# Patient Record
Sex: FEMALE | Race: WHITE | Employment: FULL TIME | URBAN - METROPOLITAN AREA
[De-identification: names, ages, dates, MRNs, and addresses within clinical notes are randomized per-mention and may not be internally consistent; named-entity substitution may affect disease eponyms.]

---

## 2017-01-02 PROBLEM — R10.9 ABDOMINAL PAIN IN FEMALE: Status: ACTIVE | Noted: 2017-01-02

## 2017-03-22 ENCOUNTER — TRANSFERRED RECORDS (OUTPATIENT)
Dept: HEALTH INFORMATION MANAGEMENT | Facility: CLINIC | Age: 42
End: 2017-03-22

## 2017-05-18 ENCOUNTER — TRANSFERRED RECORDS (OUTPATIENT)
Dept: HEALTH INFORMATION MANAGEMENT | Facility: CLINIC | Age: 42
End: 2017-05-18

## 2017-07-24 ENCOUNTER — TRANSFERRED RECORDS (OUTPATIENT)
Dept: HEALTH INFORMATION MANAGEMENT | Facility: CLINIC | Age: 42
End: 2017-07-24

## 2018-06-04 ENCOUNTER — OFFICE VISIT (OUTPATIENT)
Dept: NEUROSURGERY | Facility: CLINIC | Age: 43
End: 2018-06-04
Attending: NURSE PRACTITIONER
Payer: COMMERCIAL

## 2018-06-04 ENCOUNTER — TELEPHONE (OUTPATIENT)
Dept: NEUROSURGERY | Facility: CLINIC | Age: 43
End: 2018-06-04

## 2018-06-04 ENCOUNTER — RADIANT APPOINTMENT (OUTPATIENT)
Dept: GENERAL RADIOLOGY | Facility: CLINIC | Age: 43
End: 2018-06-04
Attending: NURSE PRACTITIONER
Payer: COMMERCIAL

## 2018-06-04 ENCOUNTER — HOSPITAL ENCOUNTER (OUTPATIENT)
Dept: MRI IMAGING | Facility: CLINIC | Age: 43
Discharge: HOME | End: 2018-06-04
Attending: NURSE PRACTITIONER | Admitting: NURSE PRACTITIONER
Payer: COMMERCIAL

## 2018-06-04 VITALS — HEART RATE: 75 BPM | OXYGEN SATURATION: 97 % | SYSTOLIC BLOOD PRESSURE: 100 MMHG | DIASTOLIC BLOOD PRESSURE: 72 MMHG

## 2018-06-04 DIAGNOSIS — Z98.1 STATUS POST LUMBAR SPINAL FUSION: ICD-10-CM

## 2018-06-04 DIAGNOSIS — Z98.1 STATUS POST LUMBAR SPINAL FUSION: Primary | ICD-10-CM

## 2018-06-04 DIAGNOSIS — M53.3 CHRONIC LEFT SI JOINT PAIN: Primary | ICD-10-CM

## 2018-06-04 DIAGNOSIS — G89.29 CHRONIC LEFT SI JOINT PAIN: Primary | ICD-10-CM

## 2018-06-04 PROCEDURE — 25000128 H RX IP 250 OP 636: Performed by: RADIOLOGY

## 2018-06-04 PROCEDURE — A9585 GADOBUTROL INJECTION: HCPCS | Performed by: RADIOLOGY

## 2018-06-04 PROCEDURE — 99243 OFF/OP CNSLTJ NEW/EST LOW 30: CPT | Performed by: NURSE PRACTITIONER

## 2018-06-04 PROCEDURE — 72100 X-RAY EXAM L-S SPINE 2/3 VWS: CPT

## 2018-06-04 PROCEDURE — G0463 HOSPITAL OUTPT CLINIC VISIT: HCPCS | Performed by: NURSE PRACTITIONER

## 2018-06-04 PROCEDURE — 72158 MRI LUMBAR SPINE W/O & W/DYE: CPT

## 2018-06-04 RX ORDER — GADOBUTROL 604.72 MG/ML
10 INJECTION INTRAVENOUS ONCE
Status: COMPLETED | OUTPATIENT
Start: 2018-06-04 | End: 2018-06-04

## 2018-06-04 RX ADMIN — GADOBUTROL 8 ML: 604.72 INJECTION INTRAVENOUS at 11:42

## 2018-06-04 NOTE — PATIENT INSTRUCTIONS
1. Please call [facility] to have lumbar MRI completed. Call [phone] to schedule your scan.  I will contact you with results.     2. Please schedule your physical therapy.

## 2018-06-04 NOTE — NURSING NOTE
"Santa Lundberg is a 43 year old female who presents for:  Chief Complaint   Patient presents with     Neurologic Problem     Low back pain for 4 years, status post lumbar fusion DOS 03/22/17 w/Mina Krause         Vitals:    Vitals:    06/04/18 1010   BP: 100/72   BP Location: Right arm   Patient Position: Sitting   Cuff Size: Adult Regular   Pulse: 75   SpO2: 97%       BMI:  Estimated body mass index is 28.3 kg/(m^2) as calculated from the following:    Height as of 12/31/16: 5' 7\" (1.702 m).    Weight as of 12/31/16: 180 lb 11.2 oz (82 kg).    Pain Score:  Data Unavailable      Do you feel safe in your environment?  Yes      Isabel Valenzuela          "

## 2018-06-04 NOTE — MR AVS SNAPSHOT
After Visit Summary   6/4/2018    Santa Lundberg    MRN: [id number]           Patient Information     Date Of Birth          1975        Visit Information        Provider Department      6/4/2018 10:00 AM Debra Wills APRN CNP [facility]        Today's Diagnoses     Status post lumbar spinal fusion    -  1      Care Instructions    1. Please call [facility] to have lumbar MRI completed. Call [phone] to schedule your scan.  I will contact you with results.     2. Please schedule your physical therapy.            Follow-ups after your visit        Additional Services     [facility]ERRE PT, HAND, AND CHIROPRACTIC REFERRAL       **This order will print in the [facility] Scheduling Office**    Physical Therapy, Hand Therapy and Chiropractic Care are available through:    *[facility]  *[facility]  *[facility]    Call one number to schedule at any of the above locations: [phone].    Your provider has referred you to: Physical Therapy at [facility] or [facility]    Indication/Reason for Referral: low back and left hip leg   Onset of Illness: chronic   Therapy Orders: Evaluate and Treat  Special Programs: None  Special Request: None    Aj Rocha      Additional Comments for the Therapist or Chiropractor:       Please be aware that coverage of these services is subject to the terms and limitations of your health insurance plan.  Call member services at your health plan with any benefit or coverage questions.      Please bring the following to your appointment:    *Your personal calendar for scheduling future appointments  *Comfortable clothing                  Future tests that were ordered for you today     Open Future Orders        Priority Expected Expires Ordered    MR Lumbar Spine w/o Contrast Routine  6/4/2019 6/4/2018            Who to contact     If you have questions or need follow up information about today's  "clinic visit or your schedule please contact [facility] directly at [phone].  Normal or non-critical lab and imaging results will be communicated to you by MyChart, letter or phone within 4 business days after the clinic has received the results. If you do not hear from us within 7 days, please contact the clinic through [employer] or phone. If you have a critical or abnormal lab result, we will notify you by phone as soon as possible.  Submit refill requests through [employer] or call your pharmacy and they will forward the refill request to us. Please allow 3 business days for your refill to be completed.          Additional Information About Your Visit        MyChart Information     [employer] lets you send messages to your doctor, view your test results, renew your prescriptions, schedule appointments and more. To sign up, go to [email or web address] . Click on \"Log in\" on the left side of the screen, which will take you to the Welcome page. Then click on \"Sign up Now\" on the right side of the page.     You will be asked to enter the access code listed below, as well as some personal information. Please follow the directions to create your username and password.     Your access code is: [email or web address]  Expires: 2018 10:35 AM     Your access code will  in 90 days. If you need help or a new code, please call your [place] clinic or [phone].        Care EveryWhere ID     This is your Care EveryWhere ID. This could be used by other organizations to access your [place] medical records  [id number]        Your Vitals Were     Pulse Pulse Oximetry                75 97%           Blood Pressure from Last 3 Encounters:   18 100/72   17 117/68   10/20/13 122/70    Weight from Last 3 Encounters:   16 180 lb 11.2 oz (82 kg)   10/20/13 180 lb (81.6 kg)   12 145 lb (65.8 kg)              We Performed the Following     PIERRE PT, HAND, AND CHIROPRACTIC REFERRAL        "   Today's Medication Changes          These changes are accurate as of 6/4/18 10:35 AM.  If you have any questions, ask your nurse or doctor.               Stop taking these medicines if you haven't already. Please contact your care team if you have questions.     AMBIEN 5 MG tablet   Generic drug:  zolpidem   Stopped by:  Debra Wills APRN CNP           baclofen 10 MG tablet   Commonly known as:  LIORESAL   Stopped by:  Debra Wills APRN CNP           norethindrone-ethinyl estradiol 1-20 MG-MCG per tablet   Commonly known as:  JUNEL FE 1/20   Stopped by:  Debra Wills APRN CNP           oxyCODONE IR 5 MG tablet   Commonly known as:  ROXICODONE   Stopped by:  Debra Wills APRN CNP                    Primary Care Provider Office Phone # Fax #    Umesh Olivia PA-C [phone]       [facility] [address] [place] [address]        Equal Access to Services     [facility]: Hadii aad ku hadasho Soomaali, waaxda luqadaha, qaybta kaalmada adeegyada, waxay idiin hayaan deveneg shiraaramekhi golden'graham . So [facility] [phone].    ATENCIÓN: Si habla español, tiene a ramsey disposición servicios gratuitos de asistencia lingüística. [facility] [phone].    We comply with applicable federal civil rights laws and [place] laws. We do not discriminate on the basis of race, color, national origin, age, disability, sex, sexual orientation, or gender identity.            Thank you!     Thank you for choosing [facility]  for your care. Our goal is always to provide you with excellent care. Hearing back from our patients is one way we can continue to improve our services. Please take a few minutes to complete the written survey that you may receive in the mail after your visit with us. Thank you!             Your Updated Medication List - Protect others around you: Learn how to safely use, store and throw away your medicines at www.disposemymeds.org.          This list is  accurate as of 6/4/18 10:35 AM.  Always use your most recent med list.                   Brand Name Dispense Instructions for use Diagnosis    acetaminophen 325 MG tablet    TYLENOL    100 tablet    Take 2 tablets (650 mg) by mouth every 4 hours as needed for mild pain        ADVIL PO      Take 200 mg by mouth 2 times daily        GABAPENTIN      Take 300 mg by mouth 3 times daily as needed        LORAZEPAM PO      Take 0.5-1 mg by mouth daily as needed for anxiety        SERTRALINE HCL PO      Take 150 mg by mouth daily        TIZANIDINE HCL PO      Take 2 mg by mouth 3 times daily

## 2018-06-04 NOTE — PROGRESS NOTES
Dr. Reynaldo Joaquin  [facility]  Neurosurgery Clinic Visit      CC: left hip and buttock pain     Primary care Provider: Umesh Olivia      Reason For Visit:   I was asked by Dr. Olivia to consult on the patient for left hip and buttock pain.      HPI: Santa Lundberg is a 43 year old female with left hip and buttock pain.  The pt is post L5-S1 ALIF with Dr. Yoav Enriquez at [facility] on 3-.  She reports that she continues to have pain. She was referred her because her surgeon is no longer with [facility].  She also did not like the care she had She reports that prior to surgery she had severe lumbar radicular pain on the left.  She notes now her pain is low back with radiation to the left buttock, SI joint and hip.  She denies any paraesthesias. She notes pain with lifting and ROM. She states that she feels weak overall due to not being very active or doing PT. She states that she was told by the surgeon not to do any for 1 year post op.  She works from home at this time. She also has not had any recent injection therapy.      Pain at its worst 10  Pain right now:  4    Past Medical History:   Diagnosis Date     Kidney stone     9mm, required surgical removal       Past Medical History reviewed with patient during visit.    History reviewed. No pertinent surgical history.  Past Surgical History reviewed with patient during visit.    Current Outpatient Prescriptions   Medication     acetaminophen (TYLENOL) 325 MG tablet     GABAPENTIN     Ibuprofen (ADVIL PO)     LORAZEPAM PO     SERTRALINE HCL PO     TIZANIDINE HCL PO     No current facility-administered medications for this visit.        Allergies   Allergen Reactions     Bactrim [Sulfamethoxazole W-Trimethoprim]      Keflex [Cephalexin] Unknown     Penicillins      Rash and vomiting     Vancomycin Itching     Hot flashes       Social History     Social History     Marital status:      Spouse name: N/A     Number of  children: N/A     Years of education: N/A     Social History Main Topics     Smoking status: Never Smoker     Smokeless tobacco: Never Used     Alcohol use Yes      Comment: occasional     Drug use: No     Sexual activity: Not Asked     Other Topics Concern     None     Social History Narrative       History reviewed. No pertinent family history.      Review Of Systems  Skin: negative  Eyes: negative  Ears/Nose/Throat: negative  Respiratory: No shortness of breath, dyspnea on exertion, cough, or hemoptysis  Cardiovascular: negative  Gastrointestinal: hx rectal bleeding, abdominal pain   Genitourinary: negative  Musculoskeletal: post lumbar fusion, left hip pain   Neurologic: left leg shooting pain   Psychiatric: negative  Hematologic/Lymphatic/Immunologic: negative  Endocrine: negative     ROS: 10 point ROS neg other than the symptoms noted above in the HPI.      Vital Signs: /72 (BP Location: Right arm, Patient Position: Sitting, Cuff Size: Adult Regular)  Pulse 75  SpO2 97%    Examination:  Constitutional:  Alert, well nourished, NAD.  Memory: recent and remote memory intact  HEENT: Normocephalic, atraumatic.   Pulm:  Without shortness of breath   CV:  No pitting edema of BLE.    Neurological:  Awake  Alert  Oriented x 3  Speech clear  Cranial nerves II - XII intact  PERRL  EOMI  Face symmetric  Tongue midline  Motor exam   Shoulder Abduction:  Right:  5/5   Left:  5/5  Biceps:                      Right:  5/5   Left:  5/5  Triceps:                     Right:  5/5   Left:  5/5  Wrist Extensors:       Right:  5/5   Left:  5/5  Wrist Flexors:           Right:  5/5   Left:  5/5  Intrinsics:                   Right:  5/5   Left:  5/5   Hip Flexor:                Right: 5/5  Left:  5/5  Hip Adductor:             Right:  5/5  Left:  5/5  Hip Abductor:             Right:  5/5  Left:  5/5  Gastroc Soleus:        Right:  5/5  Left:  5/5  Tib/Ant:                      Right:  5/5  Left:  5/5  EHL:                           Right:  5/5  Left:  5/5   Sensation normal to bilateral upper and lower extremities  Muscle tone to bilateral upper and lower extremities normal   Gait: Able to stand from a seated position. Normal non-antalgic, non-myelopathic gait.  Able to heel/toe walk without loss of balance    Lumbar examination reveals tenderness of the spine and paraspinous muscles.  Restricted and painful ROM in all planes.   Hip height is symmetrical. Left SI joint pain with palpation, negative sciatic notch or greater trochanteric tenderness to palpation bilaterally.  Straight leg raise is negative bilaterally.      Imaging:     Lumbar XRay: shows anterior L4-5 fusion intact. Will wait for radiology read.       Assessment/Plan:    Santa Lundberg is a 43 year old female with left hip and buttock pain.  The pt is post L5-S1 ALIF with Dr. Yoav Enriquez at [facility] on 3-.  She reports that she continues to have pain. She was referred her because her surgeon is no longer with [facility].  She also did not like the care she had She reports that prior to surgery she had severe lumbar radicular pain on the left.  She notes now her pain is low back with radiation to the left buttock, SI joint and hip.  She denies any paraesthesias. She notes pain with lifting and ROM. She states that she feels weak overall due to not being very active or doing PT. She states that she was told by the surgeon not to do any for 1 year post op.  She works from home at this time. She also has not had any recent injection therapy.  The pt is neurologically intact.  We discussed that her fusion looks stable but will wait for radiology read. However, due to ongoing pain we will obtain an updated MRI and have her start PT.  She may benefit from a left SI joint injection in the future.     Patient Instructions   1. Please call [facility] to have lumbar MRI completed. Call [phone] to schedule your scan.  I will contact you with  results.     2. Please schedule your physical therapy.             Debra Wills [facility]  [facility]  [address] [address]    Tel [phone]  Pager [phone]

## 2018-06-04 NOTE — LETTER
6/4/2018         RE: Santa Lundberg  [address]  [place] [address]        Dear Colleague,    Thank you for referring your patient, Santa Lundberg, to the [facility]. Please see a copy of my visit note below.    Dr. Reynaldo Joaquin  [facility]  Neurosurgery Clinic Visit      CC: left hip and buttock pain     Primary care Provider: Umesh Olivia      Reason For Visit:   I was asked by Dr. Olivia to consult on the patient for left hip and buttock pain.      HPI: Santa Lundberg is a 43 year old female with left hip and buttock pain.  The pt is post L5-S1 ALIF with Dr. Yoav Enriquez at [facility] on 3-.  She reports that she continues to have pain. She was referred her because her surgeon is no longer with [facility].  She also did not like the care she had She reports that prior to surgery she had severe lumbar radicular pain on the left.  She notes now her pain is low back with radiation to the left buttock, SI joint and hip.  She denies any paraesthesias. She notes pain with lifting and ROM. She states that she feels weak overall due to not being very active or doing PT. She states that she was told by the surgeon not to do any for 1 year post op.  She works from home at this time. She also has not had any recent injection therapy.      Pain at its worst 10  Pain right now:  4    Past Medical History:   Diagnosis Date     Kidney stone     9mm, required surgical removal       Past Medical History reviewed with patient during visit.    History reviewed. No pertinent surgical history.  Past Surgical History reviewed with patient during visit.    Current Outpatient Prescriptions   Medication     acetaminophen (TYLENOL) 325 MG tablet     GABAPENTIN     Ibuprofen (ADVIL PO)     LORAZEPAM PO     SERTRALINE HCL PO     TIZANIDINE HCL PO     No current facility-administered medications for this visit.        Allergies   Allergen Reactions     Bactrim  [Sulfamethoxazole W-Trimethoprim]      Keflex [Cephalexin] Unknown     Penicillins      Rash and vomiting     Vancomycin Itching     Hot flashes       Social History     Social History     Marital status:      Spouse name: N/A     Number of children: N/A     Years of education: N/A     Social History Main Topics     Smoking status: Never Smoker     Smokeless tobacco: Never Used     Alcohol use Yes      Comment: occasional     Drug use: No     Sexual activity: Not Asked     Other Topics Concern     None     Social History Narrative       History reviewed. No pertinent family history.      Review Of Systems  Skin: negative  Eyes: negative  Ears/Nose/Throat: negative  Respiratory: No shortness of breath, dyspnea on exertion, cough, or hemoptysis  Cardiovascular: negative  Gastrointestinal: hx rectal bleeding, abdominal pain   Genitourinary: negative  Musculoskeletal: post lumbar fusion, left hip pain   Neurologic: left leg shooting pain   Psychiatric: negative  Hematologic/Lymphatic/Immunologic: negative  Endocrine: negative     ROS: 10 point ROS neg other than the symptoms noted above in the HPI.      Vital Signs: /72 (BP Location: Right arm, Patient Position: Sitting, Cuff Size: Adult Regular)  Pulse 75  SpO2 97%    Examination:  Constitutional:  Alert, well nourished, NAD.  Memory: recent and remote memory intact  HEENT: Normocephalic, atraumatic.   Pulm:  Without shortness of breath   CV:  No pitting edema of BLE.    Neurological:  Awake  Alert  Oriented x 3  Speech clear  Cranial nerves II - XII intact  PERRL  EOMI  Face symmetric  Tongue midline  Motor exam   Shoulder Abduction:  Right:  5/5   Left:  5/5  Biceps:                      Right:  5/5   Left:  5/5  Triceps:                     Right:  5/5   Left:  5/5  Wrist Extensors:       Right:  5/5   Left:  5/5  Wrist Flexors:           Right:  5/5   Left:  5/5  Intrinsics:                   Right:  5/5   Left:  5/5   Hip Flexor:                 Right: 5/5  Left:  5/5  Hip Adductor:             Right:  5/5  Left:  5/5  Hip Abductor:             Right:  5/5  Left:  5/5  Gastroc Soleus:        Right:  5/5  Left:  5/5  Tib/Ant:                      Right:  5/5  Left:  5/5  EHL:                          Right:  5/5  Left:  5/5   Sensation normal to bilateral upper and lower extremities  Muscle tone to bilateral upper and lower extremities normal   Gait: Able to stand from a seated position. Normal non-antalgic, non-myelopathic gait.  Able to heel/toe walk without loss of balance    Lumbar examination reveals tenderness of the spine and paraspinous muscles.  Restricted and painful ROM in all planes.   Hip height is symmetrical. Left SI joint pain with palpation, negative sciatic notch or greater trochanteric tenderness to palpation bilaterally.  Straight leg raise is negative bilaterally.      Imaging:     Lumbar XRay: shows anterior L4-5 fusion intact. Will wait for radiology read.       Assessment/Plan:    Santa Lundberg is a 43 year old female with left hip and buttock pain.  The pt is post L5-S1 ALIF with Dr. Yoav Enriquez at [facility] on 3-.  She reports that she continues to have pain. She was referred her because her surgeon is no longer with [facility].  She also did not like the care she had She reports that prior to surgery she had severe lumbar radicular pain on the left.  She notes now her pain is low back with radiation to the left buttock, SI joint and hip.  She denies any paraesthesias. She notes pain with lifting and ROM. She states that she feels weak overall due to not being very active or doing PT. She states that she was told by the surgeon not to do any for 1 year post op.  She works from home at this time. She also has not had any recent injection therapy.  The pt is neurologically intact.  We discussed that her fusion looks stable but will wait for radiology read. However, due to ongoing pain we will obtain an updated MRI  and have her start PT.  She may benefit from a left SI joint injection in the future.     Patient Instructions   1. Please call [facility] to have lumbar MRI completed. Call [phone] to schedule your scan.  I will contact you with results.     2. Please schedule your physical therapy.             Debra Wills CNP  [facility]  [address] [address]    Tel [phone]  Pager [phone]      Again, thank you for allowing me to participate in the care of your patient.        Sincerely,        SUNDAR Soto CNP

## 2018-06-04 NOTE — TELEPHONE ENCOUNTER
Pt contacted with MRI results. Fusion intact and MRI otherwise normal. Recc. SI joint and chiro care. No surgery indicated at this time. She agreed to the POC>

## 2018-06-05 ENCOUNTER — TELEPHONE (OUTPATIENT)
Dept: PALLIATIVE MEDICINE | Facility: CLINIC | Age: 43
End: 2018-06-05

## 2018-06-05 NOTE — TELEPHONE ENCOUNTER
Pre-screening Questions for Radiology Injections:    Injection to be done at which interventional clinic site? [facility], instruct patient to arrive 30 minutes before the scheduled appointment time.     Procedure ordered by Debra Wills    Procedure ordered? Left SI Joint Injection    What insurance would patient like us to bill for this procedure? [employer]      Worker's comp or MVA (motor vehicle accident) -Any injection DO NOT SCHEDULE and route to Shari Workman.      [employer] - For SI joint injections, DO NOT SCHEDULE and route Nya Andres. [employer] NO PA REQUIRED EFFECTIVE 11/1/2017      [employer]- MBB's must be scheduled at LEAST two weeks apart      [employer] - Any injection besides hip/shoulder/knee joint DO NOT SCHEDULE and route to Nya Andres. She will obtain PA and call pt back to schedule procedure or notify pt of denial.       [employer]-Route to Nya for review    Any chance of pregnancy? NO   If YES, do NOT schedule and route to RN pool    Is an  needed? No     Patient has a drive home? (mandatory) YES: INFORMED    Is patient taking any blood thinners (plavix, coumadin, jantoven, warfarin, heparin, pradaxa or dabigatran )? No   If hold needed, do NOT schedule, route to RN pool     Is patient taking any aspirin products? No     If more than 325mg/day do NOT schedule; route to RN pool     For CERVICAL procedures, hold all aspirin products for 6 days.      Does the patient have a bleeding or clotting disorder? No     If YES, okay to schedule AND route to RN nurse pool    **For any patients with platelet count <100, must be forwarded to provider**    Is patient diabetic?  No  If YES, have them bring their glucometer.    Does patient have an active infection or treated for one within the past week? No     Is patient currently taking any antibiotics?  No     For patients on chronic, preventative, or prophylactic antibiotics, procedures  may be scheduled.     For patients on antibiotics for active or recent infection:    Eber Velez Burton, Snitzer-antibiotic course must have been completed for 4 days    Is patient currently taking any steroid medications? (i.e. Prednisone, Medrol)  No     For patients on steroid medications:    Eber Velez Burton, Snitzer-steroid course must have been completed for 4 days    Reviewed with patient:  If you are started on any steroids or antibiotics between now and your appointment, you must contact us because it may affect our ability to perform your procedure.  Yes    Is patient actively being treated for cancer or immunocompromised? No  If YES, do NOT schedule and route to RN pool     Are you able to get on and off an exam table with minimal or no assistance? Yes  If NO, do NOT schedule and route to RN pool    Are you able to roll over and lay on your stomach with minimal or no assistance? Yes  If NO, do NOT schedule and route to RN pool     Any allergies to contrast dye, iodine, shellfish, or numbing and steroid medications? No  If YES, route to RN pool AND add allergy information to appointment notes    Allergies: Bactrim [sulfamethoxazole w-trimethoprim]; Keflex [cephalexin]; Penicillins; and Vancomycin      Has the patient had a flu shot or any other vaccinations within 7 days before or after the procedure.  No     Does patient have an MRI/CT?  Not Applicable  (SI joint, hip injections, lumbar sympathetic blocks, and stellate ganglion blocks do not require an MRI)    Was the MRI done w/in the last 3 years?  NA    Was MRI done at [place]? No      If not, where was it done? N/A       If MRI was not done at [place], [facility] or [facility] do NOT schedule and route to nursing.  If pt has an imaging disc, the injection may be scheduled but pt has to bring disc to appt. If they show up w/out disc the injection cannot be done    Reminders (please tell patient if applicable):        Instructed pt to arrive 30 minutes early for IV start if this is for a cervical procedure, ALL sympathetic (stellate ganglion, hypogastric, or lumbar sympathetic block) and all sedation procedures (RFA, spinal cord stimulation trials).  Not Applicable   -IVs are not routinely placed for Dr. Porter cervical cases   -Dr. Burgess: IVs for cervical ESIs and cervical TBDs (not CMBBs/facet inj)      If NPO for sedation, informed patient that it is okay to take medications with sips of water (except if they are to hold blood thinners).  Not Applicable   *DO take blood pressure medication if it is prescribed*      If this is for a cervical DOROTHY, informed patient that aspirin needs to be held for 6 days.   Not Applicable      For all patients not having spinal cord stimulator (SCS) trials or radiofrequency ablations (RFAs), informed patient:    IV sedation is not provided for this procedure.  If you feel that an oral anti-anxiety medication is needed, you can discuss this further with your referring provider or primary care provider.  The Pain Clinic provider will discuss specifics of what the procedure includes at your appointment.  Most procedures last 10-20 minutes.  We use numbing medications to help with any discomfort during the procedure.  Not Applicable      Do not schedule procedures requiring IV placement in the first appointment of the day or first appointment after lunch. Do NOT schedule at 0745, 0815 or 1245. N/A      For patients 85 or older we recommend having an adult stay w/ them for the remainder of the day.   N/A    Does the patient have any questions?  NO  Tanisha Davis  [facility]

## 2018-06-12 NOTE — PROGRESS NOTES
[facility] - Procedure Note    Date of Service: 6/13/2018  Procedure performed: Left sacroiliac joint injection  Diagnosis: Sacroiliac joint pain  : Tracee Porter MD & Isadora Pizarro MD (pain fellow)   Anesthesia: none    Indications: Santa Lundberg is a 43 year old female who is seen at the request of Debra Wills CNP for a sacroiliac joint injection. The patient describes left sided low back and buttock pain. Exam shows full strength and sensation in both lower extremities, tenderness of the sacroiliac (SI) joint, and positive FABERE on the left. The patient reports minimal improvement with conservative treatment, including PT, medication and previous L5-S1 fusion with hardware.    Imaging:  MRI lumbar spine completed on 6/04/2018 showed:  FINDINGS: Sagittal images demonstrate normal posterior alignment.  There has been anterior fusion procedure at L5-S1. There is a small  rudimentary disc at S-S2. The conus medullaris and cauda equina nerve  roots appear normal. Remainder of disc spaces appear normal. Bone  marrow signal intensity is normal. Postcontrast images do not show any  abnormal areas of enhancement.     L1-L2: Normal.     L2-L3: Normal.     L3-L4: Normal.     L4-L5: Minimal facet hypertrophy. No stenosis.     L5-S1: This level has been fused. There is no stenosis. There is some  minimal facet hypertrophy.     Paraspinal soft tissues: Unremarkable as visualized.       IMPRESSION:  1. Prior L5-S1 fusion procedure.  2. Minimal facet hypertrophy at L4-L5 and L5-S1 without any stenoses    Allergies:      Allergies   Allergen Reactions     Bactrim [Sulfamethoxazole W-Trimethoprim]      Keflex [Cephalexin] Unknown     Penicillins      Rash and vomiting     Vancomycin Itching     Hot flashes        Vitals:  There were no vitals taken for this visit.    Options/alternatives, benefits and risks were discussed with the patient including bleeding, infection, tissue trauma, exposure  to radiation, reaction to medications including seizure, nerve injury, weakness, and numbness.  Questions were answered to her satisfaction and she agrees to proceed. Voluntary informed consent was obtained and signed.     Procedure:  After getting informed consent, patient was brought into the procedure suite and was placed in a prone position on the procedure table.   A Pause for the Cause was performed.  Patient was prepped and draped in sterile fashion.     After identifying the left SI joint, the C-arm was rotated to a obliquely to obtain the best view of the inferior angle of the joint.  A total of 2 ml of Lidocaine 1%  was used to anesthetize the skin at a skin entry site coaxial with the fluoroscopy beam at this location.  A 22 gauge 3.5 inch needle was advanced under intermittent fluoroscopy until it was felt to enter the SI joint.    A total of 1 ml of Omnipaque-300 was injected, confirming appropriate position, with spread into the intraarticular space, with no intravascular uptake noted.  9 ml of Omnipaque-300 was wasted. Location was verified in lateral view.    1 ml of 1% lidocaine, 1 ml of 0.5% bupivacaine with 40 mg of kenalog was injected.  The needle was removed. Hemostasis was achieved, the area was cleaned, and bandaids were placed when appropriate.  The patient tolerated the procedure well, and was taken to the recovery room.    Images were saved to PACS.    Pre-procedure pain score: 5/10  Post-procedure pain score: 3/10  Following today's procedure, the patient was advised to contact the [facility] for any of the following:   Fever, chills, or night sweats   New onset of pain, numbness, or weakness   Any questions/concerns regarding the procedure    -f/u with the referring provider      Tracee Porter MD   [facility]

## 2018-06-13 ENCOUNTER — RADIANT APPOINTMENT (OUTPATIENT)
Dept: GENERAL RADIOLOGY | Facility: CLINIC | Age: 43
End: 2018-06-13
Attending: ANESTHESIOLOGY
Payer: COMMERCIAL

## 2018-06-13 ENCOUNTER — RADIOLOGY INJECTION OFFICE VISIT (OUTPATIENT)
Dept: PALLIATIVE MEDICINE | Facility: CLINIC | Age: 43
End: 2018-06-13
Payer: COMMERCIAL

## 2018-06-13 VITALS — DIASTOLIC BLOOD PRESSURE: 103 MMHG | HEART RATE: 94 BPM | SYSTOLIC BLOOD PRESSURE: 153 MMHG | OXYGEN SATURATION: 96 %

## 2018-06-13 DIAGNOSIS — M53.3 CHRONIC LEFT SI JOINT PAIN: ICD-10-CM

## 2018-06-13 DIAGNOSIS — G89.29 CHRONIC LEFT SI JOINT PAIN: ICD-10-CM

## 2018-06-13 DIAGNOSIS — M46.1 SACROILIITIS (H): Primary | ICD-10-CM

## 2018-06-13 PROCEDURE — 27096 INJECT SACROILIAC JOINT: CPT | Mod: LT | Performed by: ANESTHESIOLOGY

## 2018-06-13 NOTE — NURSING NOTE
Discharge Information    IV Discontiued Time:  NA    Amount of Fluid Infused:  NA    Discharge Criteria = When patient returns to baseline or as per MD order    Consciousness:  Pt is fully awake    Circulation:  BP +/- 20% of pre-procedure level    Respiration:  Patient is able to breathe deeply    O2 Sat:  Patient is able to maintain O2 Sat >92% on room air    Activity:  Moves 4 extremities on command    Ambulation:  Patient is able to stand and walk or stand and pivot into wheelchair    Dressing:  Clean/dry or No Dressing    Notes:   Discharge instructions and AVS given to patient    Patient meets criteria for discharge?  YES    Admitted to PCU?  No    Responsible adult present to accompany patient home?  Yes    Signature/Title:    Karen Soto RN Care Coordinator  [facility]

## 2018-06-13 NOTE — MR AVS SNAPSHOT
After Visit Summary   6/13/2018    Santa Lundberg    MRN: [id number]           Patient Information     Date Of Birth          1975        Visit Information        Provider Department      6/13/2018 2:15 PM Tracee Porter MD [facility]        Care Instructions    [facility] Procedure Discharge Instructions    Today you saw:   Dr. Tracee Porter           Your procedure:     Sacro-iliac joint injection       Medications used:  Lidocaine (anesthetic)  Bupivacaine (anesthetic)   Kenalog (steroid)  Omnipaque (contrast)                Be cautious when walking as numbness and/or weakness in the legs may occur up to 6-8 hours after the procedure due to effect of the local anesthetic    Do not drive for 6 hours. The effect of the local anesthetic could slow your reflexes.     Avoid strenuous activity for the first 24 hours. You may resume your regular activities after that.     You may shower, however avoid swimming, tub baths or hot tubs for 24 hours following your procedure    You may have a mild to moderate increase in pain for several days following the injection.      You may use ice packs for 10-15 minutes, 3 to 4 times a day at the injection site for comfort    Do not use heat to painful areas for 6 to 8 hours. This will give the local anesthetic time to wear off and prevent you from accidentally burning your skin.    You may use anti-inflammatory medications (such as Ibuprofen/Advil or Aleve) or Tylenol for pain control if necessary    With diabetes, check your blood sugar more frequently than usual as your blood sugar may be higher than normal for 10-14 days following a steroid injection. Contact your doctor who manages your diabetes if your blood sugar is higher than usual    It may take up to 14 days for the steroid medication to start working although you may feel the effect as early as a few days after the procedure.     Follow up with your referring provider in  "2-3 weeks      If you experience any of the following, call the pain center line during work hours at [phone] or on-call physician after hours at [phone]:  -Fever over 100 degree F  -Swelling, bleeding, redness, drainage, warmth at the injection site  -Progressive weakness or numbness in your legs or arms  -Loss of bowel or bladder function  -Unusual headache that is not relieved by Tylenol or your regular headache medication  -Unusual new onset of pain that is not improving    Phone #s:  Nurse triage line for general questions: [phone]          Follow-ups after your visit        Who to contact     If you have questions or need follow up information about today's clinic visit or your schedule please contact [facility] directly at [phone].  Normal or non-critical lab and imaging results will be communicated to you by [email or web address], letter or phone within 4 business days after the clinic has received the results. If you do not hear from us within 7 days, please contact the clinic through [email or web address] or phone. If you have a critical or abnormal lab result, we will notify you by phone as soon as possible.  Submit refill requests through [employer] or call your pharmacy and they will forward the refill request to us. Please allow 3 business days for your refill to be completed.          Additional Information About Your Visit        [employer] Information     [employer] lets you send messages to your doctor, view your test results, renew your prescriptions, schedule appointments and more. To sign up, go to [email or web address] . Click on \"Log in\" on the left side of the screen, which will take you to the Welcome page. Then click on \"Sign up Now\" on the right side of the page.     You will be asked to enter the access code listed below, as well as some personal information. Please follow the directions to create your username and password.     Your access code is: [email or web address]  Expires: 9/2/2018 10:35 " AM     Your access code will  in 90 days. If you need help or a new code, please call your [facility] clinic or [phone].        Care EveryWhere ID     This is your Care EveryWhere ID. This could be used by other organizations to access your [facility] medical records  [id number]        Your Vitals Were     Pulse Pulse Oximetry                101 97%           Blood Pressure from Last 3 Encounters:   18 (!) 158/102   18 100/72   17 117/68    Weight from Last 3 Encounters:   16 82 kg (180 lb 11.2 oz)   10/20/13 81.6 kg (180 lb)   12 65.8 kg (145 lb)              Today, you had the following     No orders found for display       Primary Care Provider Office Phone # Fax #    Umesh Olivia PA-C [phone]       [facility] [address] [place] [address]        Equal Access to Services     [facility]: Hadii aad ku hadasho Soomaali, waaxda luqadaha, qaybta kaalmada adeegyada, waxay idiin hayaan [employer] . [facility] [phone].    ATENCIÓN: Si habla español, tiene a ramsey disposición servicios gratuitos de asistencia lingüística. Llame al [phone].    We comply with applicable federal civil rights laws and [place] laws. We do not discriminate on the basis of race, color, national origin, age, disability, sex, sexual orientation, or gender identity.            Thank you!     Thank you for choosing [facility]  for your care. Our goal is always to provide you with excellent care. Hearing back from our patients is one way we can continue to improve our services. Please take a few minutes to complete the written survey that you may receive in the mail after your visit with us. Thank you!             Your Updated Medication List - Protect others around you: Learn how to safely use, store and throw away your medicines at www.disposemymeds.org.          This list is accurate as of 18  2:42 PM.  Always use your most recent med list.                    Brand Name Dispense Instructions for use Diagnosis    acetaminophen 325 MG tablet    TYLENOL    100 tablet    Take 2 tablets (650 mg) by mouth every 4 hours as needed for mild pain        ADVIL PO      Take 200 mg by mouth 2 times daily        GABAPENTIN      Take 300 mg by mouth 3 times daily as needed        LORAZEPAM PO      Take 0.5-1 mg by mouth daily as needed for anxiety        SERTRALINE HCL PO      Take 150 mg by mouth daily        TIZANIDINE HCL PO      Take 2 mg by mouth 3 times daily

## 2018-06-13 NOTE — PATIENT INSTRUCTIONS
[facility] Procedure Discharge Instructions    Today you saw:   Dr. Tracee Porter           Your procedure:     Sacro-iliac joint injection       Medications used:  Lidocaine (anesthetic)  Bupivacaine (anesthetic)   Kenalog (steroid)  Omnipaque (contrast)                Be cautious when walking as numbness and/or weakness in the legs may occur up to 6-8 hours after the procedure due to effect of the local anesthetic    Do not drive for 6 hours. The effect of the local anesthetic could slow your reflexes.     Avoid strenuous activity for the first 24 hours. You may resume your regular activities after that.     You may shower, however avoid swimming, tub baths or hot tubs for 24 hours following your procedure    You may have a mild to moderate increase in pain for several days following the injection.      You may use ice packs for 10-15 minutes, 3 to 4 times a day at the injection site for comfort    Do not use heat to painful areas for 6 to 8 hours. This will give the local anesthetic time to wear off and prevent you from accidentally burning your skin.    You may use anti-inflammatory medications (such as Ibuprofen/Advil or Aleve) or Tylenol for pain control if necessary    With diabetes, check your blood sugar more frequently than usual as your blood sugar may be higher than normal for 10-14 days following a steroid injection. Contact your doctor who manages your diabetes if your blood sugar is higher than usual    It may take up to 14 days for the steroid medication to start working although you may feel the effect as early as a few days after the procedure.     Follow up with your referring provider in 2-3 weeks      If you experience any of the following, call the pain center line during work hours at [phone] or on-call physician after hours at [phone]:  -Fever over 100 degree F  -Swelling, bleeding, redness, drainage, warmth at the injection site  -Progressive weakness or numbness in your  legs or arms  -Loss of bowel or bladder function  -Unusual headache that is not relieved by Tylenol or your regular headache medication  -Unusual new onset of pain that is not improving    Phone #s:  Nurse triage line for general questions: [phone]

## 2018-06-18 ENCOUNTER — THERAPY VISIT (OUTPATIENT)
Dept: PHYSICAL THERAPY | Facility: CLINIC | Age: 43
End: 2018-06-18
Payer: COMMERCIAL

## 2018-06-18 DIAGNOSIS — M54.9 BACK PAIN: Primary | ICD-10-CM

## 2018-06-18 PROCEDURE — 97110 THERAPEUTIC EXERCISES: CPT | Mod: GP | Performed by: PHYSICAL THERAPIST

## 2018-06-18 PROCEDURE — 97161 PT EVAL LOW COMPLEX 20 MIN: CPT | Mod: GP | Performed by: PHYSICAL THERAPIST

## 2018-06-18 ASSESSMENT — ACTIVITIES OF DAILY LIVING (ADL)
HOS_ADL_SCORE(%): 52.94
STANDING_FOR_15_MINUTES: EXTREME DIFFICULTY
WALKING_INITIALLY: SLIGHT DIFFICULTY
LIGHT_TO_MODERATE_WORK: MODERATE DIFFICULTY
WALKING_DOWN_STEEP_HILLS: MODERATE DIFFICULTY
WALKING_15_MINUTES_OR_GREATER: EXTREME DIFFICULTY
DEEP_SQUATTING: MODERATE DIFFICULTY
HEAVY_WORK: EXTREME DIFFICULTY
GETTING_INTO_AND_OUT_OF_AN_AVERAGE_CAR: SLIGHT DIFFICULTY
RECREATIONAL_ACTIVITIES: MODERATE DIFFICULTY
STEPPING_UP_AND_DOWN_CURBS: NO DIFFICULTY AT ALL
GETTING_INTO_AND_OUT_OF_A_BATHTUB: MODERATE DIFFICULTY
GOING_UP_1_FLIGHT_OF_STAIRS: SLIGHT DIFFICULTY
HOS_ADL_ITEM_SCORE_TOTAL: 36
PUTTING_ON_SOCKS_AND_SHOES: SLIGHT DIFFICULTY
WALKING_UP_STEEP_HILLS: MODERATE DIFFICULTY
HOS_ADL_HIGHEST_POTENTIAL_SCORE: 68
HOS_ADL_COUNT: 17
HOW_WOULD_YOU_RATE_YOUR_CURRENT_LEVEL_OF_FUNCTION_DURING_YOUR_USUAL_ACTIVITIES_OF_DAILY_LIVING_FROM_0_TO_100_WITH_100_BEING_YOUR_LEVEL_OF_FUNCTION_PRIOR_TO_YOUR_HIP_PROBLEM_AND_0_BEING_THE_INABILITY_TO_PERFORM_ANY_OF_YOUR_USUAL_DAILY_ACTIVITIES?: 60
GOING_DOWN_1_FLIGHT_OF_STAIRS: SLIGHT DIFFICULTY
WALKING_APPROXIMATELY_10_MINUTES: MODERATE DIFFICULTY
ROLLING_OVER_IN_BED: MODERATE DIFFICULTY
TWISTING/PIVOTING_ON_INVOLVED_LEG: EXTREME DIFFICULTY
SITTING_FOR_15_MINUTES: MODERATE DIFFICULTY

## 2018-06-18 NOTE — PROGRESS NOTES
"[facility] Initial Evaluation  Subjective:  Patient is a 43 year old female presenting with rehab back hpi. The history is provided by the patient. No  was used.   Santa Lundberg is a 43 year old female with a lumbar and sacroiliac condition.  Condition occurred with:  Insidious onset.  Condition occurred: for unknown reasons.  This is a chronic condition  Patient reports persistent low back pain since 2012.  Patient had one level fusion in 3/2017.  Leg symptoms resolved with fusion.  Fusion helped LBP 20-30%.  Patient reports no improvement in LBP since left SI injection on 6/13/2018.  Patient c/o pain with prolong sitting, standing, and brisk walking.  Patient denies radicular symptoms.  Physical therapy prior to surgery involved core strengthening with machines.  Physical therapy was ordered 6/4/2018.    Patient reports pain:  SI joint left, lumbar spine left, SI joint right and lumbar spine right (L >> R).  Radiates to:  Gluteals left and gluteals right.  Pain is described as aching (with \"burst and flare\" of pain) and is constant and reported as 4/10.  Associated symptoms:  Loss of motion/stiffness. Pain is the same all the time.  Symptoms are exacerbated by standing and sitting (changing positions) and relieved by activity/movement, NSAID's, muscle relaxants and ice.  Since onset symptoms are gradually worsening.  Special tests:  X-ray and MRI.  Previous treatment: none recently.    General health as reported by patient is fair.  Pertinent medical history includes:  Overweight and migraines/headaches.  Medical allergies: no.  Other surgeries include:  Orthopedic surgery and other.  Current medications:  Sleep medication, muscle relaxants and anti-inflammatory.  Current occupation .    Primary job tasks include:  Prolonged sitting.    Barriers include:  None as reported by the patient.    Red flags:  None as reported by the patient.                    "     Objective:  Standing Alignment:        Lumbar:  Normal            Gait:    Gait Type:  Normal                    Lumbar/SI Evaluation  ROM:    AROM Lumbar:   Flexion:          Min Loss  Ext:                    Mod Loss.  Increased ROM after repeated press-up.   Side Bend:        Left:     Right:   Rotation:           Left:     Right:   Side Glide:        Left:  WNL - ERP    Right:  WNL        Strength: Fair core strength  Lumbar Myotomes:  normal                  Neural Tension/Mobility:      Left side:SLR  negative.     Right side:   SLR  negative.       Lumbar Provocation:    Left positive with:  Mobility and PROM hip  Right positive with: Mobility and PROM hip    SI joint/Sacrum:        Left positive at:    Squish; Ilium Ventromedial; Thigh thrust and Sacral thrust  Right positive at:    Squish; Ilium Ventromedial; Thigh thrust and Sacral thrust                                                 General     ROS    Assessment/Plan:    Patient is a 43 year old female with lumbar and sacral complaints.    Patient has the following significant findings with corresponding treatment plan.                Diagnosis 1:  Lumbar derangement  Pain -  self management, education, directional preference exercise and home program  Decreased ROM/flexibility - therapeutic exercise, therapeutic activity and home program  Decreased strength - therapeutic exercise, therapeutic activities and home program  Impaired muscle performance - neuro re-education and home program  Decreased function - therapeutic activities and home program    Therapy Evaluation Codes:   1) History comprised of:   Personal factors that impact the plan of care:      None.    Comorbidity factors that impact the plan of care are:      High blood pressure and Overweight.     Medications impacting care: Anti-inflammatory, Muscle relaxant and Sleep.  2) Examination of Body Systems comprised of:   Body structures and functions that impact the plan of care:       Lumbar spine and Sacral illiac joint.   Activity limitations that impact the plan of care are:      Sitting, Standing and Walking.  3) Clinical presentation characteristics are:   Stable/Uncomplicated.  4) Decision-Making    Low complexity using standardized patient assessment instrument and/or measureable assessment of functional outcome.  Cumulative Therapy Evaluation is: Low complexity.    Previous and current functional limitations:  (See Goal Flow Sheet for this information)    Short term and Long term goals: (See Goal Flow Sheet for this information)     Communication ability:  Patient appears to be able to clearly communicate and understand verbal and written communication and follow directions correctly.  Treatment Explanation - The following has been discussed with the patient:   RX ordered/plan of care  Anticipated outcomes  Possible risks and side effects  This patient would benefit from PT intervention to resume normal activities.   Rehab potential is good.    Frequency:  1 X week, once daily  Duration:  for 8 weeks  Discharge Plan:  Achieve all LTG.  Independent in home treatment program.  Reach maximal therapeutic benefit.    Please refer to the daily flowsheet for treatment today, total treatment time and time spent performing 1:1 timed codes.

## 2018-06-25 ENCOUNTER — THERAPY VISIT (OUTPATIENT)
Dept: PHYSICAL THERAPY | Facility: CLINIC | Age: 43
End: 2018-06-25
Payer: COMMERCIAL

## 2018-06-25 DIAGNOSIS — M54.9 BACK PAIN: ICD-10-CM

## 2018-06-25 PROCEDURE — 97110 THERAPEUTIC EXERCISES: CPT | Mod: GP | Performed by: PHYSICAL THERAPIST

## 2018-06-25 PROCEDURE — 97112 NEUROMUSCULAR REEDUCATION: CPT | Mod: GP | Performed by: PHYSICAL THERAPIST

## 2018-07-02 ENCOUNTER — THERAPY VISIT (OUTPATIENT)
Dept: PHYSICAL THERAPY | Facility: CLINIC | Age: 43
End: 2018-07-02
Payer: COMMERCIAL

## 2018-07-02 DIAGNOSIS — M54.9 BACK PAIN: ICD-10-CM

## 2018-07-02 PROCEDURE — 97110 THERAPEUTIC EXERCISES: CPT | Mod: GP | Performed by: PHYSICAL THERAPIST

## 2018-07-02 PROCEDURE — 97112 NEUROMUSCULAR REEDUCATION: CPT | Mod: GP | Performed by: PHYSICAL THERAPIST

## 2018-07-16 ENCOUNTER — THERAPY VISIT (OUTPATIENT)
Dept: PHYSICAL THERAPY | Facility: CLINIC | Age: 43
End: 2018-07-16
Payer: COMMERCIAL

## 2018-07-16 DIAGNOSIS — M54.9 BACK PAIN: ICD-10-CM

## 2018-07-16 PROCEDURE — 97112 NEUROMUSCULAR REEDUCATION: CPT | Mod: GP | Performed by: PHYSICAL THERAPIST

## 2018-07-16 PROCEDURE — 97110 THERAPEUTIC EXERCISES: CPT | Mod: GP | Performed by: PHYSICAL THERAPIST

## 2018-08-06 ENCOUNTER — THERAPY VISIT (OUTPATIENT)
Dept: PHYSICAL THERAPY | Facility: CLINIC | Age: 43
End: 2018-08-06
Payer: COMMERCIAL

## 2018-08-06 DIAGNOSIS — M54.9 BACK PAIN: ICD-10-CM

## 2018-08-06 PROCEDURE — 97112 NEUROMUSCULAR REEDUCATION: CPT | Mod: GP | Performed by: PHYSICAL THERAPIST

## 2018-08-06 PROCEDURE — 97110 THERAPEUTIC EXERCISES: CPT | Mod: GP | Performed by: PHYSICAL THERAPIST

## 2019-01-22 PROBLEM — M54.9 BACK PAIN: Status: RESOLVED | Noted: 2018-06-18 | Resolved: 2019-01-22

## 2019-01-22 NOTE — PROGRESS NOTES
Discharge Note    Progress reporting period is from initial eval/last PN to Aug 6, 2018.     Santa failed to return for next follow up visit and current status is unknown.  Please see information below for last relevant information on current status.  Patient seen for 5 visits.  SUBJECTIVE  Subjective changes noted by patient:  Overall improvement is slow but notices more mobility.  .  Current pain level is 3/10.     Previous pain level was   .   Changes in function:  Yes (See Goal flowsheet attached for changes in current functional level)  Adverse reaction to treatment or activity: None    OBJECTIVE  Changes noted in objective findings: TROM: ext min loss.  ERP with side glide motions both directions.      ASSESSMENT/PLAN  Diagnosis: LBP   DIAGP:  The encounter diagnosis was Back pain.  STG/LTGs have been met or progress has been made towards goals:  Yes, please see goal flowsheet for most current information  Assessment of Progress: current status is unknown.    Last current status: Pt is progressing well   Self Management Plans:  HEP  I have re-evaluated this patient and find that the nature, scope, duration and intensity of the therapy is appropriate for the medical condition of the patient.  Santa continues to require the following intervention to meet STG and LTG's:  HEP.    Recommendations:  Discharge with current home program.  Patient to follow up with MD as needed.    Please refer to the daily flowsheet for treatment today, total treatment time and time spent performing 1:1 timed codes.

## 2021-06-01 ENCOUNTER — HOSPITAL ENCOUNTER (INPATIENT)
Facility: CLINIC | Age: 46
LOS: 3 days | Discharge: HOME | End: 2021-06-04
Attending: INTERNAL MEDICINE | Admitting: INTERNAL MEDICINE
Payer: COMMERCIAL

## 2021-06-01 DIAGNOSIS — T78.40XD ALLERGIC REACTION, SUBSEQUENT ENCOUNTER: ICD-10-CM
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
## 2021-06-01 DIAGNOSIS — S61.452A DOG BITE OF LEFT HAND, INITIAL ENCOUNTER: Primary | ICD-10-CM

## 2021-06-01 DIAGNOSIS — S61.452A DOG BITE OF LEFT HAND, INITIAL ENCOUNTER: ICD-10-CM

## 2021-06-01 LAB
GRAM STN SPEC: ABNORMAL
GRAM STN SPEC: ABNORMAL
Lab: ABNORMAL
SPECIMEN SOURCE: ABNORMAL

## 2021-06-01 PROCEDURE — 120N000006 HC R&B PEDS

## 2021-06-01 PROCEDURE — 87205 SMEAR GRAM STAIN: CPT | Performed by: ORTHOPAEDIC SURGERY

## 2021-06-01 PROCEDURE — 87077 CULTURE AEROBIC IDENTIFY: CPT | Performed by: ORTHOPAEDIC SURGERY

## 2021-06-01 PROCEDURE — 250N000011 HC RX IP 250 OP 636: Performed by: PHYSICIAN ASSISTANT

## 2021-06-01 PROCEDURE — 87070 CULTURE OTHR SPECIMN AEROBIC: CPT | Performed by: ORTHOPAEDIC SURGERY

## 2021-06-01 PROCEDURE — 99207 PR APP CREDIT; MD BILLING SHARED VISIT: CPT | Performed by: PHYSICIAN ASSISTANT

## 2021-06-01 PROCEDURE — 99223 1ST HOSP IP/OBS HIGH 75: CPT | Mod: AI | Performed by: PHYSICIAN ASSISTANT

## 2021-06-01 PROCEDURE — 250N000013 HC RX MED GY IP 250 OP 250 PS 637: Performed by: PHYSICIAN ASSISTANT

## 2021-06-01 RX ORDER — GABAPENTIN 300 MG/1
300 CAPSULE ORAL 3 TIMES DAILY
Status: DISCONTINUED | OUTPATIENT
Start: 2021-06-01 | End: 2021-06-04 | Stop reason: HOSPADM

## 2021-06-01 RX ORDER — CIPROFLOXACIN 2 MG/ML
400 INJECTION, SOLUTION INTRAVENOUS EVERY 12 HOURS
Status: DISCONTINUED | OUTPATIENT
Start: 2021-06-01 | End: 2021-06-03

## 2021-06-01 RX ORDER — AMOXICILLIN 250 MG
1 CAPSULE ORAL 2 TIMES DAILY PRN
Status: DISCONTINUED | OUTPATIENT
Start: 2021-06-01 | End: 2021-06-04 | Stop reason: HOSPADM

## 2021-06-01 RX ORDER — METOPROLOL SUCCINATE 50 MG/1
50 TABLET, EXTENDED RELEASE ORAL EVERY MORNING
COMMUNITY

## 2021-06-01 RX ORDER — NALOXONE HYDROCHLORIDE 0.4 MG/ML
0.2 INJECTION, SOLUTION INTRAMUSCULAR; INTRAVENOUS; SUBCUTANEOUS
Status: DISCONTINUED | OUTPATIENT
Start: 2021-06-01 | End: 2021-06-04 | Stop reason: HOSPADM

## 2021-06-01 RX ORDER — NALOXONE HYDROCHLORIDE 0.4 MG/ML
0.4 INJECTION, SOLUTION INTRAMUSCULAR; INTRAVENOUS; SUBCUTANEOUS
Status: DISCONTINUED | OUTPATIENT
Start: 2021-06-01 | End: 2021-06-04 | Stop reason: HOSPADM

## 2021-06-01 RX ORDER — ACETAMINOPHEN 325 MG/1
650 TABLET ORAL EVERY 4 HOURS PRN
Status: DISCONTINUED | OUTPATIENT
Start: 2021-06-01 | End: 2021-06-04 | Stop reason: HOSPADM

## 2021-06-01 RX ORDER — IBUPROFEN 200 MG
200 TABLET ORAL 2 TIMES DAILY
Status: ON HOLD | COMMUNITY
End: 2021-06-03

## 2021-06-01 RX ORDER — OXYCODONE HYDROCHLORIDE 5 MG/1
5 TABLET ORAL EVERY 4 HOURS PRN
Status: DISCONTINUED | OUTPATIENT
Start: 2021-06-01 | End: 2021-06-04 | Stop reason: HOSPADM

## 2021-06-01 RX ORDER — LIDOCAINE 40 MG/G
CREAM TOPICAL
Status: DISCONTINUED | OUTPATIENT
Start: 2021-06-01 | End: 2021-06-04 | Stop reason: HOSPADM

## 2021-06-01 RX ORDER — LORAZEPAM 1 MG/1
.5-1 TABLET ORAL DAILY PRN
COMMUNITY

## 2021-06-01 RX ORDER — SERTRALINE HYDROCHLORIDE 100 MG/1
200 TABLET, FILM COATED ORAL EVERY MORNING
Status: DISCONTINUED | OUTPATIENT
Start: 2021-06-02 | End: 2021-06-04 | Stop reason: HOSPADM

## 2021-06-01 RX ORDER — NORTRIPTYLINE HYDROCHLORIDE 75 MG/1
75 CAPSULE ORAL AT BEDTIME
COMMUNITY

## 2021-06-01 RX ORDER — ONDANSETRON 2 MG/ML
4 INJECTION INTRAMUSCULAR; INTRAVENOUS EVERY 6 HOURS PRN
Status: DISCONTINUED | OUTPATIENT
Start: 2021-06-01 | End: 2021-06-04 | Stop reason: HOSPADM

## 2021-06-01 RX ORDER — RIZATRIPTAN BENZOATE 10 MG/1
10 TABLET ORAL
COMMUNITY

## 2021-06-01 RX ORDER — GABAPENTIN 300 MG/1
300 CAPSULE ORAL 3 TIMES DAILY
COMMUNITY

## 2021-06-01 RX ORDER — METOPROLOL SUCCINATE 50 MG/1
50 TABLET, EXTENDED RELEASE ORAL EVERY MORNING
Status: DISCONTINUED | OUTPATIENT
Start: 2021-06-02 | End: 2021-06-04 | Stop reason: HOSPADM

## 2021-06-01 RX ORDER — IBUPROFEN 600 MG/1
600 TABLET, FILM COATED ORAL EVERY 6 HOURS PRN
Status: DISCONTINUED | OUTPATIENT
Start: 2021-06-01 | End: 2021-06-04 | Stop reason: HOSPADM

## 2021-06-01 RX ORDER — ONDANSETRON 4 MG/1
4 TABLET, ORALLY DISINTEGRATING ORAL EVERY 6 HOURS PRN
Status: DISCONTINUED | OUTPATIENT
Start: 2021-06-01 | End: 2021-06-04 | Stop reason: HOSPADM

## 2021-06-01 RX ORDER — SERTRALINE HYDROCHLORIDE 100 MG/1
200 TABLET, FILM COATED ORAL EVERY MORNING
COMMUNITY

## 2021-06-01 RX ORDER — AMOXICILLIN 250 MG
2 CAPSULE ORAL 2 TIMES DAILY PRN
Status: DISCONTINUED | OUTPATIENT
Start: 2021-06-01 | End: 2021-06-04 | Stop reason: HOSPADM

## 2021-06-01 RX ADMIN — METRONIDAZOLE 500 MG: 500 INJECTION, SOLUTION INTRAVENOUS at 22:31

## 2021-06-01 RX ADMIN — IBUPROFEN 600 MG: 600 TABLET ORAL at 14:47

## 2021-06-01 RX ADMIN — GABAPENTIN 300 MG: 300 CAPSULE ORAL at 20:18

## 2021-06-01 RX ADMIN — CIPROFLOXACIN 400 MG: 2 INJECTION, SOLUTION INTRAVENOUS at 17:33

## 2021-06-01 RX ADMIN — ACETAMINOPHEN 650 MG: 325 TABLET, FILM COATED ORAL at 14:11

## 2021-06-01 RX ADMIN — ONDANSETRON 4 MG: 4 TABLET, ORALLY DISINTEGRATING ORAL at 18:48

## 2021-06-01 RX ADMIN — OXYCODONE HYDROCHLORIDE 5 MG: 5 TABLET ORAL at 14:11

## 2021-06-01 RX ADMIN — METRONIDAZOLE 500 MG: 500 INJECTION, SOLUTION INTRAVENOUS at 15:14

## 2021-06-01 RX ADMIN — IBUPROFEN 600 MG: 600 TABLET ORAL at 21:37

## 2021-06-01 RX ADMIN — OXYCODONE HYDROCHLORIDE 5 MG: 5 TABLET ORAL at 18:03

## 2021-06-01 RX ADMIN — NORTRIPTYLINE HYDROCHLORIDE 75 MG: 50 CAPSULE ORAL at 23:36

## 2021-06-01 RX ADMIN — ACETAMINOPHEN 650 MG: 325 TABLET, FILM COATED ORAL at 18:03

## 2021-06-01 RX ADMIN — OXYCODONE HYDROCHLORIDE 5 MG: 5 TABLET ORAL at 21:37

## 2021-06-01 NOTE — PLAN OF CARE
Afebrile. HTN. Received Oxycodone and Tylenol for c/o L hand/wrist pain; will monitor effectiveness. L hand and digits with moderate edema and blanchable redness. L thumb with scab and small amount of drainage. LUE elevated on pillows. Plan: Ortho consulting. IV antibiotics. Pain control. Monitor and provide for needs.

## 2021-06-01 NOTE — TREATMENT PLAN
Left palmar hand dog bite with lymphangitis     Plan:  IV abx per hospitialist recommendation given patient allergies   Elevate Left UE on 3 pillows when at rest  Images sent to Dr Delmi Garrison hibiclens/warm water hand soaks   Patient ate at 12:30pm today, ok to eat today and NPO effective midnight with exam in am.    Belinda Emerson PA-C on 6/1/2021 at 4:19 PM

## 2021-06-01 NOTE — H&P
[facility]    Hospitalist History and Physical    Name: Santa Lundberg    MRN: [id number]  YOB: 1975    Age: 46 year old  Date of Admission:  6/1/2021  Date of Service (when I saw the patient): 06/01/21    Assessment & Plan   Santa Lundberg is a 46 year old female with PMH significant for chronic back pain secondary to spinal stenosis, migraine headaches, depression, hypertension, and GERD who presents as a direct admission from [facility] for further treatment of a dog bite of her left hand with associated cellulitis.     Workup at [facility] revealed: VSS, Covid negative, mild leukocytosis of 10.4, normal lactic acid, and sodium of 136 otherwise BMP unremarkable.  Received a dose of IV Invanz while at the urgency room prior to admission due to history of multiple antibiotic allergies. Of note the patient's last tetanus shot was at an outside hospital on 5/31/21.    #Dog bite of left dorsal hand  #Cellulitis of left UE: sustained dog bit to left dorsum of hand and left fifth digit with reported deep wound that was sutured closed at outside hospital on 5/31. Started on PO doxycycline, which she has taken three doses of prior to admission.  Seen at [facility] due to subjective fevers, nausea, erythema located over fifth digit and dorsum of hand with associated lymphangitis, swelling of affected area, and throbbing pain minimally improved with medication.  No reported purulent drainage from bite site.  Dog was up-to-date on his vaccinations and patient received an updated tetanus shot on 5/31/21. Afebrile and only mild leukocytosis based on outside CBC.   - IV Ciprofloxacin and Flagyl (day #1)   - Elevate left UE  - PRN ice  - Orthopedic surgery consult, appreciate recommendations   - pain control  - CMS checks  - NPO at midnight in case patient needs I&D in AM    #HTN: continue PTA Metoprolol    #Depression: resume PTA Sertraline and  Gabapentin    #Migraine headaches: resume prophylactic Nortriptyline     DVT Prophylaxis: Pneumatic Compression Devices  Code Status: Full Code  Disposition: Expected discharge stay >2 midnights, will admit to inpatient     Primary Care Physician   Umesh Olivia    Chief Complaint   Dog bite of left hand     History obtained from discussion with Dr. Foster at [facility], chart review, and interview with patient.     History of Present Illness   Santa Lundberg is a 46 year old female who presents for evaluation of worsening left hand dog bite with cellulitis.  The patient states that around 12:30 AM on 5/31 she was bit by her nephew's dog that is an adopted mixed pit bull up-to-date on his vaccinations.  The dog bit her on her left palm.  She is uncertain the reason the dog bit her and reports it did not seem agitated prior to the bite.  The patient was at her cabin in [place] when this happened and went to a local hospital.  At the hospital she received a tetanus shot, had stitches placed in her palm, and started on a course of oral doxycycline based on her antibiotic allergies.  She reports that her hand was soaked in an antibiotic solution prior to stitches being placed.  She has taken 3 doses of the doxycycline.  Over the last day the patient has felt febrile without chills or diaphoresis, nauseated without vomiting, increased pain in her left hand that she describes as throbbing, and onset of erythema with worsening swelling of her left hand.  She reports the redness started spreading up her arm today which was concerning to her.  She denies associated numbness or tingling.  She is still able to wiggle her fingers but unable to make a fist with her left hand.  She notes serosanguineous drainage from the bite and denies purulent drainage.  She has been taking over-the-counter ibuprofen and Tylenol for the pain with a few doses of Vicodin from the prescription she received at the outside hospital.   Denies any other prior history of animal bites.    Past Medical History    Past Medical History:   Diagnosis Date     Kidney stone     9mm, required surgical removal     Past Surgical History   No past surgical history on file.    Prior to Admission Medications   Prior to Admission Medications   Prescriptions Last Dose Informant Patient Reported? Taking?   GABAPENTIN   Yes No   Sig: Take 300 mg by mouth 3 times daily as needed    Ibuprofen (ADVIL PO)   Yes No   Sig: Take 200 mg by mouth 2 times daily   LORAZEPAM PO   Yes No   Sig: Take 0.5-1 mg by mouth daily as needed for anxiety   SERTRALINE HCL PO   Yes No   Sig: Take 150 mg by mouth daily   TIZANIDINE HCL PO   Yes No   Sig: Take 2 mg by mouth 3 times daily   acetaminophen (TYLENOL) 325 MG tablet   Yes No   Sig: Take 2 tablets (650 mg) by mouth every 4 hours as needed for mild pain      Facility-Administered Medications: None     Allergies   Allergies   Allergen Reactions     Bactrim [Sulfamethoxazole W-Trimethoprim]      Keflex [Cephalexin] Unknown     Penicillins      Rash and vomiting     Vancomycin Itching     Hot flashes     Social History   Social History     Tobacco Use     Smoking status: Never Smoker     Smokeless tobacco: Never Used   Substance Use Topics     Alcohol use: Yes     Comment: occasional     Social History     Social History Narrative     Not on file     Family History   Family history reviewed with patient and is noncontributory.    Review of Systems   A Comprehensive greater than 10 system review of systems was carried out.  Pertinent positives and negatives are noted above.  Otherwise negative for contributory information.    Physical Exam   Temp: 98.9  F (37.2  C) Temp src: Oral BP: (!) 160/100 Pulse: 94   Resp: 12 SpO2: 99 % O2 Device: None (Room air)    Vital Signs with Ranges  Temp:  [98.9  F (37.2  C)] 98.9  F (37.2  C)  Pulse:  [94] 94  Resp:  [12] 12  BP: (160)/(100) 160/100  SpO2:  [99 %] 99 %  0 lbs 0 oz    GEN:  Alert, oriented  x 3, appears comfortable, no overt distress  HEENT:  Normocephalic/atraumatic, no scleral icterus, no nasal discharge, mouth moist.  CV:  Regular rate and rhythm, no murmur or JVD.  S1 + S2 noted, no S3 or S4.  LUNGS:  Clear to auscultation bilaterally without rales/rhonchi/wheezing/retractions.  Symmetric chest rise on inhalation noted.  ABD:  Active bowel sounds, soft, non-tender/non-distended.  No rebound/guarding/rigidity.  EXT: sutures in place over dorsal aspect of left hand with surrounding erythema and swelling, erythema over left thumb, associated lymphangiitis of left arm (area outlined with skin pen). Mild serosanguinous drainage from dog bite marks. Decreased ROM of left hand, able to wiggle fingers but unable to make a fist due to swelling. No cyanosis.  No acute joint synovitis noted.  SKIN:  Dry to touch, no exanthems noted in the visualized areas.  NEURO:  Symmetric muscle strength, sensation to touch grossly intact.  Coordination symmetric on general exam.  No new focal deficits appreciated.    Data   Data reviewed today:  I personally reviewed labs completed at [facility].    Please refer to care everywhere for results from labs performed at [facility].     Candace Menezes PA-C  I discussed the patient with Dr. Arevalo and he agrees with the above plan.

## 2021-06-01 NOTE — PROGRESS NOTES
Hospitalist [facility] Documentation    Acuity/Preferred Bed Type: medical floor  Infection Concerns: infected dog bite of hand  Additional Specialist Needed Or Specialist Already Contacted:   None  Timely Treatments Needed: Yes: IV antibiotics  Important things to know/address during hospitalization: sitter not needed     Sign out received from Dr. Foster at [facility]. The patient sustained a dog bite to her hand the morning of 5/31 and was seen at a small local hospital where she had stitches placed and started on a course of PO Doxycycline. The patient came in for evaluation today due to worsening erythema near bite site over palm and dorsal aspect of hand with associated lymphangitis. There is reported serosanguinous drainage from bite site. Per provider there is low suspicion for tenosynovitis at this time. VSS. No leukocytosis with WBC of 10.4. Patient has multiple antibiotic allergies including PCN, Bactrim, Vancomycin, and Cephalexin. Received dose of IV Invanz at [facility]. [facility] will test patient for COVID before admission.      Requested admission for monitoring on IV antibiotics and possible orthopedic surgery evaluation if surgical washout would be needed.     Angelita Menezes PA-C

## 2021-06-01 NOTE — PHARMACY-ADMISSION MEDICATION HISTORY
Admission medication history interview status for this patient is complete. See [facility] admission navigator for allergy information, prior to admission medications and immunization status.     Medication history interview done, indicate source(s): Patient  Medication history resources (including written lists, pill bottles, clinic record): Care Everywhere records, Rx refill hx.  Pharmacy:  [facility]    Changes made to PTA medication list:  Added:  Toprol XL, Nortriptyline, Omeprazole & Maxalt.  Deleted:  None  Changed:   - Gabapentin 300mg tid prn ---> tid  - Sertraline 150mg dailyy ---> 200mg qam  - Tizanidine 2mg tid ---> 4-8mg q6h prn    Actions taken by pharmacist (provider contacted, etc):None     Additional medication history information:None    Medication reconciliation/reorder completed by provider prior to medication history?  No    Prior to Admission medications    Medication Sig Last Dose Taking? Auth Provider   gabapentin (NEURONTIN) 300 MG capsule Take 300 mg by mouth 3 times daily 6/1/2021 at am Yes Unknown, Entered By History   ibuprofen (ADVIL/MOTRIN) 200 MG tablet Take 200 mg by mouth 2 times daily 6/1/2021 at am Yes Unknown, Entered By History   LORazepam (ATIVAN) 1 MG tablet Take 0.5-1 mg by mouth daily as needed for anxiety prn Yes Unknown, Entered By History   metoprolol succinate ER (TOPROL-XL) 50 MG 24 hr tablet Take 50 mg by mouth every morning 6/1/2021 at am Yes Unknown, Entered By History   nortriptyline (PAMELOR) 75 MG capsule Take 75 mg by mouth At Bedtime 5/31/2021 at pm Yes Unknown, Entered By History   omeprazole (PRILOSEC) 20 MG DR capsule Take 20 mg by mouth daily as needed prn Yes Unknown, Entered By History   rizatriptan (MAXALT) 10 MG tablet Take 10 mg by mouth at onset of headache for migraine prn Yes Unknown, Entered By History   sertraline (ZOLOFT) 100 MG tablet Take 200 mg by mouth every morning 6/1/2021 at am Yes Unknown, Entered By History   tiZANidine (ZANAFLEX) 4 MG  tablet Take 4-8 mg by mouth every 6 hours as needed for muscle spasms 5/31/2021 at pm Yes Unknown, Entered By History   acetaminophen (TYLENOL) 325 MG tablet Take 2 tablets (650 mg) by mouth every 4 hours as needed for mild pain Gabriel Cramer MD

## 2021-06-02 ENCOUNTER — ANESTHESIA EVENT (OUTPATIENT)
Dept: SURGERY | Facility: CLINIC | Age: 46
End: 2021-06-02
Payer: COMMERCIAL

## 2021-06-02 ENCOUNTER — ANESTHESIA (OUTPATIENT)
Dept: SURGERY | Facility: CLINIC | Age: 46
End: 2021-06-02
Payer: COMMERCIAL

## 2021-06-02 LAB
ANION GAP SERPL CALCULATED.3IONS-SCNC: 5 MMOL/L (ref 3–14)
BASOPHILS # BLD AUTO: 0 10E9/L (ref 0–0.2)
BASOPHILS NFR BLD AUTO: 0.4 %
BUN SERPL-MCNC: 6 MG/DL (ref 7–30)
CALCIUM SERPL-MCNC: 8.4 MG/DL (ref 8.5–10.1)
CHLORIDE SERPL-SCNC: 104 MMOL/L (ref 94–109)
CO2 SERPL-SCNC: 27 MMOL/L (ref 20–32)
CREAT SERPL-MCNC: 0.67 MG/DL (ref 0.52–1.04)
CRP SERPL-MCNC: 47.1 MG/L (ref 0–8)
DIFFERENTIAL METHOD BLD: NORMAL
EOSINOPHIL # BLD AUTO: 0.2 10E9/L (ref 0–0.7)
EOSINOPHIL NFR BLD AUTO: 4.3 %
ERYTHROCYTE [DISTWIDTH] IN BLOOD BY AUTOMATED COUNT: 12.6 % (ref 10–15)
GFR SERPL CREATININE-BSD FRML MDRD: >90 ML/MIN/{1.73_M2}
GLUCOSE SERPL-MCNC: 123 MG/DL (ref 70–99)
GRAM STN SPEC: NORMAL
GRAM STN SPEC: NORMAL
HCG UR QL: NEGATIVE
HCT VFR BLD AUTO: 43.2 % (ref 35–47)
HGB BLD-MCNC: 13.8 G/DL (ref 11.7–15.7)
IMM GRANULOCYTES # BLD: 0 10E9/L (ref 0–0.4)
IMM GRANULOCYTES NFR BLD: 0.4 %
LYMPHOCYTES # BLD AUTO: 1.3 10E9/L (ref 0.8–5.3)
LYMPHOCYTES NFR BLD AUTO: 24.8 %
Lab: NORMAL
MCH RBC QN AUTO: 28.8 PG (ref 26.5–33)
MCHC RBC AUTO-ENTMCNC: 31.9 G/DL (ref 31.5–36.5)
MCV RBC AUTO: 90 FL (ref 78–100)
MONOCYTES # BLD AUTO: 0.4 10E9/L (ref 0–1.3)
MONOCYTES NFR BLD AUTO: 8 %
NEUTROPHILS # BLD AUTO: 3.4 10E9/L (ref 1.6–8.3)
NEUTROPHILS NFR BLD AUTO: 62.1 %
NRBC # BLD AUTO: 0 10*3/UL
NRBC BLD AUTO-RTO: 0 /100
PLATELET # BLD AUTO: 230 10E9/L (ref 150–450)
POTASSIUM SERPL-SCNC: 3.6 MMOL/L (ref 3.4–5.3)
RBC # BLD AUTO: 4.79 10E12/L (ref 3.8–5.2)
SODIUM SERPL-SCNC: 136 MMOL/L (ref 133–144)
SPECIMEN SOURCE: NORMAL
WBC # BLD AUTO: 5.4 10E9/L (ref 4–11)

## 2021-06-02 PROCEDURE — 99232 SBSQ HOSP IP/OBS MODERATE 35: CPT | Performed by: INTERNAL MEDICINE

## 2021-06-02 PROCEDURE — 250N000013 HC RX MED GY IP 250 OP 250 PS 637: Performed by: ORTHOPAEDIC SURGERY

## 2021-06-02 PROCEDURE — 86140 C-REACTIVE PROTEIN: CPT | Performed by: PHYSICIAN ASSISTANT

## 2021-06-02 PROCEDURE — 250N000011 HC RX IP 250 OP 636: Performed by: PHYSICIAN ASSISTANT

## 2021-06-02 PROCEDURE — 250N000013 HC RX MED GY IP 250 OP 250 PS 637: Performed by: PHYSICIAN ASSISTANT

## 2021-06-02 PROCEDURE — 250N000011 HC RX IP 250 OP 636: Performed by: NURSE ANESTHETIST, CERTIFIED REGISTERED

## 2021-06-02 PROCEDURE — 85025 COMPLETE CBC W/AUTO DIFF WBC: CPT | Performed by: PHYSICIAN ASSISTANT

## 2021-06-02 PROCEDURE — 80048 BASIC METABOLIC PNL TOTAL CA: CPT | Performed by: PHYSICIAN ASSISTANT

## 2021-06-02 PROCEDURE — 01N50ZZ RELEASE MEDIAN NERVE, OPEN APPROACH: ICD-10-PCS | Performed by: ORTHOPAEDIC SURGERY

## 2021-06-02 PROCEDURE — 250N000011 HC RX IP 250 OP 636: Performed by: ANESTHESIOLOGY

## 2021-06-02 PROCEDURE — 272N000001 HC OR GENERAL SUPPLY STERILE: Performed by: ORTHOPAEDIC SURGERY

## 2021-06-02 PROCEDURE — 999N000141 HC STATISTIC PRE-PROCEDURE NURSING ASSESSMENT: Performed by: ORTHOPAEDIC SURGERY

## 2021-06-02 PROCEDURE — 258N000003 HC RX IP 258 OP 636: Performed by: ANESTHESIOLOGY

## 2021-06-02 PROCEDURE — 710N000009 HC RECOVERY PHASE 1, LEVEL 1, PER MIN: Performed by: ORTHOPAEDIC SURGERY

## 2021-06-02 PROCEDURE — 36415 COLL VENOUS BLD VENIPUNCTURE: CPT | Performed by: PHYSICIAN ASSISTANT

## 2021-06-02 PROCEDURE — 250N000009 HC RX 250: Performed by: ORTHOPAEDIC SURGERY

## 2021-06-02 PROCEDURE — 81025 URINE PREGNANCY TEST: CPT | Performed by: INTERNAL MEDICINE

## 2021-06-02 PROCEDURE — 0LB80ZZ EXCISION OF LEFT HAND TENDON, OPEN APPROACH: ICD-10-PCS | Performed by: ORTHOPAEDIC SURGERY

## 2021-06-02 PROCEDURE — 250N000011 HC RX IP 250 OP 636: Performed by: ORTHOPAEDIC SURGERY

## 2021-06-02 PROCEDURE — 120N000001 HC R&B MED SURG/OB

## 2021-06-02 PROCEDURE — 87205 SMEAR GRAM STAIN: CPT | Performed by: ORTHOPAEDIC SURGERY

## 2021-06-02 PROCEDURE — 250N000009 HC RX 250: Performed by: NURSE ANESTHETIST, CERTIFIED REGISTERED

## 2021-06-02 PROCEDURE — 370N000017 HC ANESTHESIA TECHNICAL FEE, PER MIN: Performed by: ORTHOPAEDIC SURGERY

## 2021-06-02 PROCEDURE — 360N000076 HC SURGERY LEVEL 3, PER MIN: Performed by: ORTHOPAEDIC SURGERY

## 2021-06-02 PROCEDURE — 87075 CULTR BACTERIA EXCEPT BLOOD: CPT | Performed by: ORTHOPAEDIC SURGERY

## 2021-06-02 PROCEDURE — 87070 CULTURE OTHR SPECIMN AEROBIC: CPT | Performed by: ORTHOPAEDIC SURGERY

## 2021-06-02 PROCEDURE — 87077 CULTURE AEROBIC IDENTIFY: CPT | Performed by: ORTHOPAEDIC SURGERY

## 2021-06-02 RX ORDER — FENTANYL CITRATE 50 UG/ML
25-50 INJECTION, SOLUTION INTRAMUSCULAR; INTRAVENOUS
Status: DISCONTINUED | OUTPATIENT
Start: 2021-06-02 | End: 2021-06-02 | Stop reason: HOSPADM

## 2021-06-02 RX ORDER — LIDOCAINE 40 MG/G
CREAM TOPICAL
Status: DISCONTINUED | OUTPATIENT
Start: 2021-06-02 | End: 2021-06-02 | Stop reason: HOSPADM

## 2021-06-02 RX ORDER — ACETAMINOPHEN 325 MG/1
975 TABLET ORAL ONCE
Status: DISCONTINUED | OUTPATIENT
Start: 2021-06-02 | End: 2021-06-02 | Stop reason: HOSPADM

## 2021-06-02 RX ORDER — DEXAMETHASONE SODIUM PHOSPHATE 4 MG/ML
INJECTION, SOLUTION INTRA-ARTICULAR; INTRALESIONAL; INTRAMUSCULAR; INTRAVENOUS; SOFT TISSUE PRN
Status: DISCONTINUED | OUTPATIENT
Start: 2021-06-02 | End: 2021-06-02

## 2021-06-02 RX ORDER — ONDANSETRON 4 MG/1
4 TABLET, ORALLY DISINTEGRATING ORAL EVERY 30 MIN PRN
Status: DISCONTINUED | OUTPATIENT
Start: 2021-06-02 | End: 2021-06-02 | Stop reason: HOSPADM

## 2021-06-02 RX ORDER — SODIUM CHLORIDE, SODIUM LACTATE, POTASSIUM CHLORIDE, CALCIUM CHLORIDE 600; 310; 30; 20 MG/100ML; MG/100ML; MG/100ML; MG/100ML
INJECTION, SOLUTION INTRAVENOUS CONTINUOUS
Status: DISCONTINUED | OUTPATIENT
Start: 2021-06-02 | End: 2021-06-02 | Stop reason: HOSPADM

## 2021-06-02 RX ORDER — BUPIVACAINE HYDROCHLORIDE 2.5 MG/ML
INJECTION, SOLUTION EPIDURAL; INFILTRATION; INTRACAUDAL PRN
Status: DISCONTINUED | OUTPATIENT
Start: 2021-06-02 | End: 2021-06-02 | Stop reason: HOSPADM

## 2021-06-02 RX ORDER — METOPROLOL TARTRATE 1 MG/ML
INJECTION, SOLUTION INTRAVENOUS PRN
Status: DISCONTINUED | OUTPATIENT
Start: 2021-06-02 | End: 2021-06-02

## 2021-06-02 RX ORDER — ONDANSETRON 2 MG/ML
INJECTION INTRAMUSCULAR; INTRAVENOUS PRN
Status: DISCONTINUED | OUTPATIENT
Start: 2021-06-02 | End: 2021-06-02

## 2021-06-02 RX ORDER — FENTANYL CITRATE 50 UG/ML
INJECTION, SOLUTION INTRAMUSCULAR; INTRAVENOUS PRN
Status: DISCONTINUED | OUTPATIENT
Start: 2021-06-02 | End: 2021-06-02

## 2021-06-02 RX ORDER — PROPOFOL 10 MG/ML
INJECTION, EMULSION INTRAVENOUS PRN
Status: DISCONTINUED | OUTPATIENT
Start: 2021-06-02 | End: 2021-06-02

## 2021-06-02 RX ORDER — HYDROMORPHONE HYDROCHLORIDE 1 MG/ML
.3-.5 INJECTION, SOLUTION INTRAMUSCULAR; INTRAVENOUS; SUBCUTANEOUS EVERY 10 MIN PRN
Status: DISCONTINUED | OUTPATIENT
Start: 2021-06-02 | End: 2021-06-02 | Stop reason: HOSPADM

## 2021-06-02 RX ORDER — GLYCOPYRROLATE 0.2 MG/ML
INJECTION, SOLUTION INTRAMUSCULAR; INTRAVENOUS PRN
Status: DISCONTINUED | OUTPATIENT
Start: 2021-06-02 | End: 2021-06-02

## 2021-06-02 RX ORDER — ONDANSETRON 2 MG/ML
4 INJECTION INTRAMUSCULAR; INTRAVENOUS EVERY 30 MIN PRN
Status: DISCONTINUED | OUTPATIENT
Start: 2021-06-02 | End: 2021-06-02 | Stop reason: HOSPADM

## 2021-06-02 RX ORDER — LIDOCAINE HYDROCHLORIDE 10 MG/ML
INJECTION, SOLUTION INFILTRATION; PERINEURAL PRN
Status: DISCONTINUED | OUTPATIENT
Start: 2021-06-02 | End: 2021-06-02

## 2021-06-02 RX ORDER — METOPROLOL TARTRATE 1 MG/ML
1-2 INJECTION, SOLUTION INTRAVENOUS EVERY 5 MIN PRN
Status: DISCONTINUED | OUTPATIENT
Start: 2021-06-02 | End: 2021-06-02 | Stop reason: HOSPADM

## 2021-06-02 RX ORDER — MEPERIDINE HYDROCHLORIDE 25 MG/ML
12.5 INJECTION INTRAMUSCULAR; INTRAVENOUS; SUBCUTANEOUS
Status: DISCONTINUED | OUTPATIENT
Start: 2021-06-02 | End: 2021-06-02 | Stop reason: HOSPADM

## 2021-06-02 RX ORDER — PROPOFOL 10 MG/ML
INJECTION, EMULSION INTRAVENOUS CONTINUOUS PRN
Status: DISCONTINUED | OUTPATIENT
Start: 2021-06-02 | End: 2021-06-02

## 2021-06-02 RX ORDER — ALBUTEROL SULFATE 0.83 MG/ML
2.5 SOLUTION RESPIRATORY (INHALATION) EVERY 4 HOURS PRN
Status: DISCONTINUED | OUTPATIENT
Start: 2021-06-02 | End: 2021-06-02 | Stop reason: HOSPADM

## 2021-06-02 RX ADMIN — ONDANSETRON 4 MG: 4 TABLET, ORALLY DISINTEGRATING ORAL at 08:57

## 2021-06-02 RX ADMIN — ACETAMINOPHEN 650 MG: 325 TABLET, FILM COATED ORAL at 06:40

## 2021-06-02 RX ADMIN — METOPROLOL SUCCINATE 50 MG: 50 TABLET, EXTENDED RELEASE ORAL at 08:31

## 2021-06-02 RX ADMIN — ACETAMINOPHEN 650 MG: 325 TABLET, FILM COATED ORAL at 12:17

## 2021-06-02 RX ADMIN — ONDANSETRON HYDROCHLORIDE 4 MG: 2 INJECTION, SOLUTION INTRAVENOUS at 16:42

## 2021-06-02 RX ADMIN — FENTANYL CITRATE 50 MCG: 50 INJECTION, SOLUTION INTRAMUSCULAR; INTRAVENOUS at 15:56

## 2021-06-02 RX ADMIN — FENTANYL CITRATE 50 MCG: 50 INJECTION, SOLUTION INTRAMUSCULAR; INTRAVENOUS at 16:14

## 2021-06-02 RX ADMIN — GLYCOPYRROLATE 0.2 MG: 0.2 INJECTION, SOLUTION INTRAMUSCULAR; INTRAVENOUS at 15:56

## 2021-06-02 RX ADMIN — GABAPENTIN 300 MG: 300 CAPSULE ORAL at 20:05

## 2021-06-02 RX ADMIN — GABAPENTIN 300 MG: 300 CAPSULE ORAL at 08:31

## 2021-06-02 RX ADMIN — HYDROMORPHONE HYDROCHLORIDE 0.5 MG: 1 INJECTION, SOLUTION INTRAMUSCULAR; INTRAVENOUS; SUBCUTANEOUS at 17:12

## 2021-06-02 RX ADMIN — SODIUM CHLORIDE, POTASSIUM CHLORIDE, SODIUM LACTATE AND CALCIUM CHLORIDE: 600; 310; 30; 20 INJECTION, SOLUTION INTRAVENOUS at 15:51

## 2021-06-02 RX ADMIN — METOPROLOL TARTRATE 5 MG: 5 INJECTION INTRAVENOUS at 15:56

## 2021-06-02 RX ADMIN — OXYCODONE HYDROCHLORIDE 5 MG: 5 TABLET ORAL at 10:21

## 2021-06-02 RX ADMIN — FENTANYL CITRATE 50 MCG: 50 INJECTION, SOLUTION INTRAMUSCULAR; INTRAVENOUS at 17:35

## 2021-06-02 RX ADMIN — METRONIDAZOLE 500 MG: 500 INJECTION, SOLUTION INTRAVENOUS at 22:39

## 2021-06-02 RX ADMIN — MIDAZOLAM 2 MG: 1 INJECTION INTRAMUSCULAR; INTRAVENOUS at 15:51

## 2021-06-02 RX ADMIN — CIPROFLOXACIN 400 MG: 2 INJECTION, SOLUTION INTRAVENOUS at 05:12

## 2021-06-02 RX ADMIN — IBUPROFEN 600 MG: 600 TABLET ORAL at 22:37

## 2021-06-02 RX ADMIN — PROPOFOL 200 MG: 10 INJECTION, EMULSION INTRAVENOUS at 15:57

## 2021-06-02 RX ADMIN — IBUPROFEN 600 MG: 600 TABLET ORAL at 06:40

## 2021-06-02 RX ADMIN — GABAPENTIN 300 MG: 300 CAPSULE ORAL at 14:03

## 2021-06-02 RX ADMIN — DEXAMETHASONE SODIUM PHOSPHATE 4 MG: 4 INJECTION, SOLUTION INTRA-ARTICULAR; INTRALESIONAL; INTRAMUSCULAR; INTRAVENOUS; SOFT TISSUE at 15:56

## 2021-06-02 RX ADMIN — ACETAMINOPHEN 650 MG: 325 TABLET, FILM COATED ORAL at 18:59

## 2021-06-02 RX ADMIN — PROPOFOL 100 MCG/KG/MIN: 10 INJECTION, EMULSION INTRAVENOUS at 16:02

## 2021-06-02 RX ADMIN — OXYCODONE HYDROCHLORIDE 5 MG: 5 TABLET ORAL at 05:16

## 2021-06-02 RX ADMIN — METRONIDAZOLE 500 MG: 500 INJECTION, SOLUTION INTRAVENOUS at 07:00

## 2021-06-02 RX ADMIN — SERTRALINE HYDROCHLORIDE 200 MG: 100 TABLET ORAL at 08:30

## 2021-06-02 RX ADMIN — FENTANYL CITRATE 50 MCG: 50 INJECTION, SOLUTION INTRAMUSCULAR; INTRAVENOUS at 17:44

## 2021-06-02 RX ADMIN — LIDOCAINE HYDROCHLORIDE 30 MG: 10 INJECTION, SOLUTION INFILTRATION; PERINEURAL at 15:56

## 2021-06-02 RX ADMIN — NORTRIPTYLINE HYDROCHLORIDE 75 MG: 50 CAPSULE ORAL at 22:38

## 2021-06-02 RX ADMIN — OXYCODONE HYDROCHLORIDE 5 MG: 5 TABLET ORAL at 20:05

## 2021-06-02 ASSESSMENT — ACTIVITIES OF DAILY LIVING (ADL): ADLS_ACUITY_SCORE: 11

## 2021-06-02 NOTE — PROGRESS NOTES
[facility]    Hospitalist Progress Note      Assessment & Plan   Santa Lundberg is a 46 year old female who was admitted on 6/1/2021.    Summary of Stay:   Santa Lundberg is a 46 year old female with PMH significant for chronic back pain secondary to spinal stenosis, migraine headaches, depression, hypertension, and GERD who presents as a direct admission from [facility] for further treatment of a dog bite of her left hand with associated cellulitis.     Santa Lundberg is a 46 year old female who presents for evaluation of worsening left hand dog bite with cellulitis.  The patient states that around 12:30 AM on 5/31 she was bit by her nephew's dog that is an adopted mixed pit bull up-to-date on his vaccinations.  The dog bit her on her left palm.  She is uncertain the reason the dog bit her and reports it did not seem agitated prior to the bite.  The patient was at her cabin in [place] when this happened and went to a local hospital.  At the hospital she received a tetanus shot, had stitches placed in her palm, and started on a course of oral doxycycline based on her antibiotic allergies.  She reports that her hand was soaked in an antibiotic solution prior to stitches being placed.  She has taken 3 doses of the doxycycline.  Over the last day the patient has felt febrile without chills or diaphoresis, nauseated without vomiting, increased pain in her left hand that she describes as throbbing, and onset of erythema with worsening swelling of her left hand.  She reports the redness started spreading up her arm today which was concerning to her.  She denies associated numbness or tingling.  She notes serosanguineous drainage from the bite and denies purulent drainage.    Patient has multiple allergies.  She was admitted to our hospital as a direct admission from urgency room.  Started on IV Cipro and Flagyl.  Hand surgery team consulted.    Patient is  right-handed.    Plan:    Left hand animal bite.  Skin and soft tissue infection.  -Seen by hand surgery team.  They remove the stitches.  Hand elevation.  -Today, patient feels better.  Better movement of the fingers.  Swelling is also down.  -Currently n.p.o. for possibility of going to the OR for incision and drainage.  -IV Cipro and Flagyl.  Multiple antibiotic allergies.    Hypertension  -Oral metoprolol as before    Depression  -PTA sertraline and gabapentin    Migraine  -On nortriptyline.      DVT Prophylaxis: Pneumatic Compression Devices  Code Status: Full Code  Expected discharge: 1-2 days    Héctor Arevalo MD  Text Page (7am - 6pm, M-F)    Interval History   Patient was evaluated with nursing staff. Overnight issues discussed.    Review of systems:  No nausea or vomiting.  No abdominal pain.  No diarrhea.  No chest pain/palpitations.  No new cough/shortness of breath.  No headache/visual disturbance/new weakness.    -Data reviewed today: Labs and medications.    Physical Exam   Temp: 98  F (36.7  C) Temp src: Oral BP: 133/80 Pulse: 83   Resp: 16 SpO2: 99 % O2 Device: None (Room air)    There were no vitals filed for this visit.  Vital Signs with Ranges  Temp:  [98  F (36.7  C)-98.7  F (37.1  C)] 98  F (36.7  C)  Pulse:  [77-83] 83  Resp:  [16] 16  BP: (133-159)/(80-99) 133/80  Cuff Mean (mmHg):  [100-141] 100  SpO2:  [96 %-100 %] 99 %  No intake/output data recorded.    Constitutional: Awake, alert, cooperative, no apparent distress  HEENT: Trachea midline, sclera is clear   Respiratory: No crackles. No wheezing. Equal breath sounds bilaterally.  Cardiovascular: Regular rate and rhythm, normal S1 and S2, and no murmur noted  GI: Normal bowel sounds, soft, non-distended, non-tender  Extremities:   Left hand:  Swelling is much better.  Better movement of the fingers.  Able to almost fully extend the fingers.  Unable to fully make a fist.  Good movement of the wrist joint.    Medications       ciprofloxacin   400 mg Intravenous Q12H     gabapentin  300 mg Oral TID     metoprolol succinate ER  50 mg Oral QAM     metroNIDAZOLE  500 mg Intravenous Q8H     nortriptyline  75 mg Oral At Bedtime     sertraline  200 mg Oral QAM     sodium chloride (PF)  3 mL Intracatheter Q8H       Data   Recent Labs   Lab 06/02/21  0654   WBC 5.4   HGB 13.8   MCV 90         POTASSIUM 3.6   CHLORIDE 104   CO2 27   BUN 6*   CR 0.67   ANIONGAP 5   GINA 8.4*   *       No results found for this or any previous visit (from the past 24 hour(s)).

## 2021-06-02 NOTE — ANESTHESIA CARE TRANSFER NOTE
Patient: Santa Lundberg    Procedure(s):  IRRIGATION AND DEBRIDEMENT, Valenzuela dog bite, and carpal tunnel release    Diagnosis: Dog bite of left hand [S61.452A, W54.0XXA]  Diagnosis Additional Information: No value filed.    Anesthesia Type:   General     Note:    Oropharynx: oropharynx clear of all foreign objects and spontaneously breathing  Level of Consciousness: awake  Oxygen Supplementation: room air    Independent Airway: airway patency satisfactory and stable  Dentition: dentition unchanged  Vital Signs Stable: post-procedure vital signs reviewed and stable  Report to RN Given: handoff report given  Patient transferred to: PACU    Handoff Report: Identifed the Patient, Identified the Reponsible Provider, Reviewed the pertinent medical history, Discussed the surgical course, Reviewed Intra-OP anesthesia mangement and issues during anesthesia, Set expectations for post-procedure period and Allowed opportunity for questions and acknowledgement of understanding      Vitals: (Last set prior to Anesthesia Care Transfer)  CRNA VITALS  6/2/2021 1625 - 6/2/2021 1701      6/2/2021             NIBP:  122/81    Pulse:  90    NIBP Mean:  95    SpO2:  100 %        Electronically Signed By: SUNDAR Blake CRNA  June 2, 2021  5:01 PM

## 2021-06-02 NOTE — PLAN OF CARE
Orientation: Alert and oriented x4  VSS. 99% on RA. Afebrile.   LS: clear and equal bilaterally.   GI: Passing gas. no BM. Denies N/V.   : Adequate urine output.   Skin: puncture wounds to left hand. Redness receding from marked borders. CMS intact. Warm to touch. 3+ edema to left hand and fingers, no change overnight.   Activity: Independent. Pt slept comfortably throughout shift.   Pain: 6/10 left hand pain. Well controlled with oral pain medication. Oxycodone x1. Ice to hand for comfort. Hand elevated on pillows throughout shift for comfort.   Plan: Continue with current cares.

## 2021-06-02 NOTE — UTILIZATION REVIEW
[facility]   Admission Status; Secondary Review Determination   Admission date:  6/1/2021  1:05 PM    Under the authority of the Utilization Management Committee, the utilization review process indicated a secondary review on the above patient. The review outcome is based on review of the medical records, discussions with staff, and applying clinical experience noted on the date of the review.     (x) Inpatient Status Appropriate - This patient's medical care is consistent with medical management for inpatient care and reasonable inpatient medical practice.     RATIONALE FOR DETERMINATION   46-year-old female with a history of chronic back pain, spinal stenosis, migraine headaches, depression, hypertension, and GERD was admitted yesterday from urgent care for evaluation of left hand cellulitis secondary to a dog bite.  She was started on IV ciprofloxacin and Flagyl.  She was requiring aggressive pain management with frequent doses of oxycodone.  Orthopedics was consulted and is recommending to continue IV antibiotics as well as n.p.o. status for possible need for I&D later today or tomorrow.  This patient is complicated, at high risk for clinical deterioration, requires ongoing IV antibiotics and potential surgical intervention, will likely be hospital several days, and is appropriate for inpatient hospitalization at the time of this review.  The severity of illness, intensity of service provided, expected LOS and risk for adverse outcome make the care complex, high risk and appropriate for hospital admission.    At the time of admission with the information available to the attending physician more than 2 nights Hospital complex care was anticipated, based on patient risk of adverse outcome if treated as outpatient and complex care required.  Inpatient admission is appropriate based on the Medicare guidelines.      The information on this document is developed by the utilization review team in order  for the business office to ensure compliance. This only denotes the appropriateness of proper admission status and does not reflect the quality of care rendered.   The definitions of Inpatient Status and Observation Status used in making the determination above are those provided in the CMS Coverage Manual, Chapter 1 and Chapter 6, section 70.4.     Sincerely,   Wilmer Martinez DO MPH   Physician Advisor  Utilization Review  [facility]

## 2021-06-02 NOTE — ANESTHESIA PREPROCEDURE EVALUATION
Anesthesia Pre-Procedure Evaluation    Patient: Santa Lundberg   MRN: [id number] : 1975        Preoperative Diagnosis: Dog bite of left hand [S61.452A, W54.0XXA]   Procedure : Procedure(s):  IRRIGATION AND DEBRIDEMENT, Valenzuela dog bite     Past Medical History:   Diagnosis Date     Kidney stone     9mm, required surgical removal      No past surgical history on file.   Allergies   Allergen Reactions     Bactrim [Sulfamethoxazole W-Trimethoprim]      Keflex [Cephalexin] Unknown     Penicillins      Rash and vomiting     Vancomycin Itching     Hot flashes      Social History     Tobacco Use     Smoking status: Never Smoker     Smokeless tobacco: Never Used   Substance Use Topics     Alcohol use: Yes     Comment: occasional      Wt Readings from Last 1 Encounters:   16 82 kg (180 lb 11.2 oz)        Anesthesia Evaluation            ROS/MED HX  ENT/Pulmonary:  - neg pulmonary ROS     Neurologic:  - neg neurologic ROS     Cardiovascular:     (+) hypertension-----    METS/Exercise Tolerance:     Hematologic:  - neg hematologic  ROS     Musculoskeletal:  - neg musculoskeletal ROS     GI/Hepatic:  - neg GI/hepatic ROS     Renal/Genitourinary:       Endo: Comment: .There is no height or weight on file to calculate BMI.   - neg endo ROS     Psychiatric/Substance Use:     (+) psychiatric history depression     Infectious Disease:  - neg infectious disease ROS     Malignancy:  - neg malignancy ROS     Other:  - neg other ROS          Physical Exam    Airway        Mallampati: II    Neck ROM: full     Respiratory Devices and Support         Dental           Cardiovascular   cardiovascular exam normal       Rhythm and rate: regular     Pulmonary   pulmonary exam normal                OUTSIDE LABS:  CBC:   Lab Results   Component Value Date    WBC 5.4 2021    WBC 10.2 2017    HGB 13.8 2021    HGB 13.3 2017    HCT 43.2 2021    HCT 40.3 2017     2021      01/02/2017     BMP:   Lab Results   Component Value Date     06/02/2021     01/02/2017    POTASSIUM 3.6 06/02/2021    POTASSIUM 3.7 01/02/2017    CHLORIDE 104 06/02/2021    CHLORIDE 104 01/02/2017    CO2 27 06/02/2021    CO2 28 01/02/2017    BUN 6 (L) 06/02/2021    BUN 6 (L) 01/02/2017    CR 0.67 06/02/2021    CR 0.70 01/02/2017     (H) 06/02/2021    GLC 94 01/02/2017     COAGS:   Lab Results   Component Value Date    INR 0.97 10/17/2012     POC:   Lab Results   Component Value Date    HCG Negative 06/02/2021     HEPATIC:   Lab Results   Component Value Date    ALBUMIN 4.0 12/31/2016    PROTTOTAL 8.2 12/31/2016     (H) 12/31/2016    AST 41 12/31/2016    ALKPHOS 63 12/31/2016    BILITOTAL 1.1 12/31/2016     OTHER:   Lab Results   Component Value Date    LACT 1.4 12/31/2016    GINA 8.4 (L) 06/02/2021    MAG 2.0 01/02/2017    LIPASE 120 12/31/2016    CRP 47.1 (H) 06/02/2021       Anesthesia Plan    ASA Status:  2      Anesthesia Type: General.     - Airway: LMA   Induction: Intravenous, Propofol.   Maintenance: Balanced.        Consents    Anesthesia Plan(s) and associated risks, benefits, and realistic alternatives discussed. Questions answered and patient/representative(s) expressed understanding.     - Discussed with:  Patient         Postoperative Care    Pain management: IV analgesics, Oral pain medications, Multi-modal analgesia.   PONV prophylaxis: Ondansetron (or other 5HT-3), Dexamethasone or Solumedrol     Comments:                Juan Santos DO

## 2021-06-02 NOTE — PLAN OF CARE
Vital Signs: VSS. Afebrile.  Pain/Comfort: Pain well managed with current pain plan, taking oxycodone, tylenol and using ice packs.  Assessment: Hand swollen, pink, having small amount of serosanguinous drainage. Intensity of redness improving. Soaked hand.  Diet: NPO  Output: Voiding  Activity/Ambulation: Up independently  Plan: Down to surgery around 1430.

## 2021-06-02 NOTE — TREATMENT PLAN
Left hand dog bite    Plan:  Patient to have I&D by Dr Awad later today  Continue NPO status  Continue IV Abx    Belinda Emerson PA-C on 6/2/2021 at 2:50 PM

## 2021-06-02 NOTE — H&P (VIEW-ONLY)
[facility] Orthopedic Consultation    Santa Lundberg MRN# [id number]   Age: 46 year old YOB: 1975     Date of Admission:  6/1/2021    Reason for consult: Dog bite/cellulitis       Requesting physician: KHOI       Level of consult: Consult, follow and place orders           Assessment and Plan:   Assessment:   Dog bite/cellulitis      Plan:   Sutures largely removed  Begin soaks  Continue IV anitbiotics Cipro/flagyl  NPO for surgery tomorrow if not improving            Chief Complaint:   Left hand infection       History is obtained from the patient         History of Present Illness:   This patient is a 46 year old female who presents with the following condition requiring a hospital admission:      Dog bite injury yesterday.  Seen in [place] urgent care and wound sutured  Discharged on doxycycline.  Increased redness and swelling today.  Seen in [facility] ER and admitted.           Past Medical History:     Past Medical History:   Diagnosis Date     Kidney stone     9mm, required surgical removal             Past Surgical History:   No past surgical history on file.          Social History:     Social History     Tobacco Use     Smoking status: Never Smoker     Smokeless tobacco: Never Used   Substance Use Topics     Alcohol use: Yes     Comment: occasional             Family History:   This patient has no significant family history          Immunizations:   Immunizations are current          Allergies:     Allergies   Allergen Reactions     Bactrim [Sulfamethoxazole W-Trimethoprim]      Keflex [Cephalexin] Unknown     Penicillins      Rash and vomiting     Vancomycin Itching     Hot flashes             Medications:     Medications Prior to Admission   Medication Sig Dispense Refill Last Dose     gabapentin (NEURONTIN) 300 MG capsule Take 300 mg by mouth 3 times daily   6/1/2021 at am     ibuprofen (ADVIL/MOTRIN) 200 MG tablet Take 200 mg by mouth 2 times daily   6/1/2021 at am      LORazepam (ATIVAN) 1 MG tablet Take 0.5-1 mg by mouth daily as needed for anxiety   prn     metoprolol succinate ER (TOPROL-XL) 50 MG 24 hr tablet Take 50 mg by mouth every morning   6/1/2021 at am     nortriptyline (PAMELOR) 75 MG capsule Take 75 mg by mouth At Bedtime   5/31/2021 at pm     omeprazole (PRILOSEC) 20 MG DR capsule Take 20 mg by mouth daily as needed   prn     rizatriptan (MAXALT) 10 MG tablet Take 10 mg by mouth at onset of headache for migraine   prn     sertraline (ZOLOFT) 100 MG tablet Take 200 mg by mouth every morning   6/1/2021 at am     tiZANidine (ZANAFLEX) 4 MG tablet Take 4-8 mg by mouth every 6 hours as needed for muscle spasms   5/31/2021 at pm     acetaminophen (TYLENOL) 325 MG tablet Take 2 tablets (650 mg) by mouth every 4 hours as needed for mild pain 100 tablet  prn             Review of Systems:   The Review of Systems is negative other than noted in the HPI.  No fevers or constitutional symptoms reported          Physical Exam:     Patient Vitals for the past 12 hrs:   BP Temp Temp src Pulse Resp SpO2   06/01/21 1756 (!) 159/99 98.7  F (37.1  C) Oral 83 16 100 %   06/01/21 1332 (!) 156/99 -- -- -- -- --   06/01/21 1300 (!) 160/100 98.9  F (37.2  C) Oral 94 12 99 %     All vitals have been reviewed    Laceration thenar eminence, more minor injury nailbed  Serous fluid with tight closure  Swelling thumb and hand  Mild volar wrist swelling  Ascending erythema to elbow  Stiffness in digital flexion without signficant pain or tendon signs  Neuro intact       Data:   No results found for any visits on 06/01/21.   AM labs ordered  X-rays at outside hospital reported as normal         Attestation:  Amount of time performed on this consult: 30 minutes.    Livier Awad MD

## 2021-06-02 NOTE — BRIEF OP NOTE
[facility]    Brief Operative Note    Pre-operative diagnosis: Dog bite of left hand [S61.452A, W54.0XXA]  Post-operative diagnosis Flexor tenosynovitis ? Early septic    Procedure: Procedure(s):  IRRIGATION AND DEBRIDEMENT, Mauricio dog bite, and carpal tunnel release  Surgeon: Surgeon(s) and Role:     * Livier Awad MD - Primary  Anesthesia: Other   Estimated blood loss: Minimal  Drains: None  Specimens:   ID Type Source Tests Collected by Time Destination   1 : Left mauricio dog bite Tissue Hand, Left ANAEROBIC BACTERIAL CULTURE, GRAM STAIN, TISSUE CULTURE AEROBIC BACTERIAL Livier Awad MD 6/2/2021  4:11 PM      Findings:   thumb FPL early synovitis and hemorrhage extending into carpal tunnel space.  Serosanguinous fluid in flexor tendons.  No purulence.  .  Complications: None.  Implants: * No implants in log *

## 2021-06-02 NOTE — PROGRESS NOTES
HD#2  Afebrile htn,VSS    Improving cellulitis, less volar wrist swelling resolving  Persistent swelling and some limitation of finger ROM, unchanged  No obvious flexor tendon signs    Improving  Continue cipro/flagyl, soaks and elevation  Will keep NPO and reevaluate later am regarding need for I&D   Plan reviewed and she understands.    Lissa Awad  [phone]

## 2021-06-02 NOTE — PLAN OF CARE
Afebrile, VSS, BP improved later in shift, overall cellulitis, wound site improving. Redness receding from marked borders up L forearm. Redness near wound-incisional site slightly less red and pt feeling like she has increased movement in L fingers. Dr. Awad here this evening and did remove a few of the stitches, hand soak then done in Chlorhexidine ( OK per DR Awad to substitute for hibiclens) which relieved some tightness. L arm elevated and ice applied. IV patent for IV antibiotics. Oxycodone, tyl and ibuprofen effective for pain control. Pain rating from a 6 to a 4. Pt lm reg diet, slight nausea, zofran x1. Will be NPO after Midnight for possible I&D in am. Ortho to see in am.

## 2021-06-02 NOTE — ANESTHESIA PROCEDURE NOTES
Airway       Patient location during procedure: OR  Staff -        CRNA: Grisel Johnson APRN CRNA       Performed By: CRNA  Consent for Airway        Urgency: elective  Indications and Patient Condition       Indications for airway management: mohit-procedural       Induction type:intravenous       Mask difficulty assessment: 1 - vent by mask    Final Airway Details       Final airway type: supraglottic airway    Supraglottic Airway Details        Type: LMA       Brand: I-Gel       LMA size: 4    Post intubation assessment        Placement verified by: capnometry, equal breath sounds and chest rise        Number of attempts at approach: 1       Number of other approaches attempted: 0       Secured with: plastic tape       Ease of procedure: easy       Dentition: Intact and Unchanged

## 2021-06-02 NOTE — CONSULTS
[facility] Orthopedic Consultation    Santa Lundberg MRN# [id number]   Age: 46 year old YOB: 1975     Date of Admission:  6/1/2021    Reason for consult: Dog bite/cellulitis       Requesting physician: KHOI       Level of consult: Consult, follow and place orders           Assessment and Plan:   Assessment:   Dog bite/cellulitis      Plan:   Sutures largely removed  Begin soaks  Continue IV anitbiotics Cipro/flagyl  NPO for surgery tomorrow if not improving            Chief Complaint:   Left hand infection       History is obtained from the patient         History of Present Illness:   This patient is a 46 year old female who presents with the following condition requiring a hospital admission:      Dog bite injury yesterday.  Seen in [place] urgent care and wound sutured  Discharged on doxycycline.  Increased redness and swelling today.  Seen in [facility] ER and admitted.           Past Medical History:     Past Medical History:   Diagnosis Date     Kidney stone     9mm, required surgical removal             Past Surgical History:   No past surgical history on file.          Social History:     Social History     Tobacco Use     Smoking status: Never Smoker     Smokeless tobacco: Never Used   Substance Use Topics     Alcohol use: Yes     Comment: occasional             Family History:   This patient has no significant family history          Immunizations:   Immunizations are current          Allergies:     Allergies   Allergen Reactions     Bactrim [Sulfamethoxazole W-Trimethoprim]      Keflex [Cephalexin] Unknown     Penicillins      Rash and vomiting     Vancomycin Itching     Hot flashes             Medications:     Medications Prior to Admission   Medication Sig Dispense Refill Last Dose     gabapentin (NEURONTIN) 300 MG capsule Take 300 mg by mouth 3 times daily   6/1/2021 at am     ibuprofen (ADVIL/MOTRIN) 200 MG tablet Take 200 mg by mouth 2 times daily   6/1/2021 at am      LORazepam (ATIVAN) 1 MG tablet Take 0.5-1 mg by mouth daily as needed for anxiety   prn     metoprolol succinate ER (TOPROL-XL) 50 MG 24 hr tablet Take 50 mg by mouth every morning   6/1/2021 at am     nortriptyline (PAMELOR) 75 MG capsule Take 75 mg by mouth At Bedtime   5/31/2021 at pm     omeprazole (PRILOSEC) 20 MG DR capsule Take 20 mg by mouth daily as needed   prn     rizatriptan (MAXALT) 10 MG tablet Take 10 mg by mouth at onset of headache for migraine   prn     sertraline (ZOLOFT) 100 MG tablet Take 200 mg by mouth every morning   6/1/2021 at am     tiZANidine (ZANAFLEX) 4 MG tablet Take 4-8 mg by mouth every 6 hours as needed for muscle spasms   5/31/2021 at pm     acetaminophen (TYLENOL) 325 MG tablet Take 2 tablets (650 mg) by mouth every 4 hours as needed for mild pain 100 tablet  prn             Review of Systems:   The Review of Systems is negative other than noted in the HPI.  No fevers or constitutional symptoms reported          Physical Exam:     Patient Vitals for the past 12 hrs:   BP Temp Temp src Pulse Resp SpO2   06/01/21 1756 (!) 159/99 98.7  F (37.1  C) Oral 83 16 100 %   06/01/21 1332 (!) 156/99 -- -- -- -- --   06/01/21 1300 (!) 160/100 98.9  F (37.2  C) Oral 94 12 99 %     All vitals have been reviewed    Laceration thenar eminence, more minor injury nailbed  Serous fluid with tight closure  Swelling thumb and hand  Mild volar wrist swelling  Ascending erythema to elbow  Stiffness in digital flexion without signficant pain or tendon signs  Neuro intact       Data:   No results found for any visits on 06/01/21.   AM labs ordered  X-rays at outside hospital reported as normal         Attestation:  Amount of time performed on this consult: 30 minutes.    Livier Awad MD

## 2021-06-03 LAB — INTERPRETATION ECG - MUSE: NORMAL

## 2021-06-03 PROCEDURE — 250N000013 HC RX MED GY IP 250 OP 250 PS 637: Performed by: PHYSICIAN ASSISTANT

## 2021-06-03 PROCEDURE — 250N000013 HC RX MED GY IP 250 OP 250 PS 637: Performed by: ORTHOPAEDIC SURGERY

## 2021-06-03 PROCEDURE — 250N000013 HC RX MED GY IP 250 OP 250 PS 637: Performed by: INTERNAL MEDICINE

## 2021-06-03 PROCEDURE — 258N000003 HC RX IP 258 OP 636: Performed by: INTERNAL MEDICINE

## 2021-06-03 PROCEDURE — 120N000001 HC R&B MED SURG/OB

## 2021-06-03 PROCEDURE — 99232 SBSQ HOSP IP/OBS MODERATE 35: CPT | Performed by: INTERNAL MEDICINE

## 2021-06-03 PROCEDURE — 250N000011 HC RX IP 250 OP 636: Performed by: ORTHOPAEDIC SURGERY

## 2021-06-03 PROCEDURE — 99207 PR CDG-MDM COMPONENT: MEETS LOW - DOWN CODED: CPT | Performed by: INTERNAL MEDICINE

## 2021-06-03 PROCEDURE — 250N000011 HC RX IP 250 OP 636: Performed by: INTERNAL MEDICINE

## 2021-06-03 RX ORDER — ONDANSETRON 4 MG/1
4 TABLET, ORALLY DISINTEGRATING ORAL EVERY 6 HOURS PRN
Qty: 8 TABLET | Refills: 0 | Status: SHIPPED | OUTPATIENT
Start: 2021-06-03

## 2021-06-03 RX ORDER — DIPHENHYDRAMINE HYDROCHLORIDE, ZINC ACETATE 2; .1 G/100G; G/100G
CREAM TOPICAL 3 TIMES DAILY PRN
Status: DISCONTINUED | OUTPATIENT
Start: 2021-06-03 | End: 2021-06-04 | Stop reason: HOSPADM

## 2021-06-03 RX ORDER — DIPHENHYDRAMINE HCL 25 MG
25 CAPSULE ORAL EVERY 6 HOURS PRN
Status: DISCONTINUED | OUTPATIENT
Start: 2021-06-03 | End: 2021-06-04 | Stop reason: HOSPADM

## 2021-06-03 RX ORDER — IBUPROFEN 600 MG/1
600 TABLET, FILM COATED ORAL EVERY 6 HOURS PRN
Qty: 20 TABLET | Refills: 0 | Status: SHIPPED | OUTPATIENT
Start: 2021-06-03

## 2021-06-03 RX ORDER — AMOXICILLIN 250 MG
1 CAPSULE ORAL 2 TIMES DAILY PRN
Qty: 20 TABLET | Refills: 0 | Status: SHIPPED | OUTPATIENT
Start: 2021-06-03

## 2021-06-03 RX ORDER — ERTAPENEM 1 G/1
1 INJECTION, POWDER, LYOPHILIZED, FOR SOLUTION INTRAMUSCULAR; INTRAVENOUS EVERY 24 HOURS
Status: DISCONTINUED | OUTPATIENT
Start: 2021-06-03 | End: 2021-06-04 | Stop reason: HOSPADM

## 2021-06-03 RX ORDER — OXYCODONE HYDROCHLORIDE 5 MG/1
5-10 TABLET ORAL EVERY 4 HOURS PRN
Qty: 25 TABLET | Refills: 0 | Status: SHIPPED | OUTPATIENT
Start: 2021-06-03

## 2021-06-03 RX ADMIN — GABAPENTIN 300 MG: 300 CAPSULE ORAL at 13:22

## 2021-06-03 RX ADMIN — OXYCODONE HYDROCHLORIDE 5 MG: 5 TABLET ORAL at 00:20

## 2021-06-03 RX ADMIN — ACETAMINOPHEN 650 MG: 325 TABLET, FILM COATED ORAL at 00:20

## 2021-06-03 RX ADMIN — METRONIDAZOLE 500 MG: 500 INJECTION, SOLUTION INTRAVENOUS at 06:49

## 2021-06-03 RX ADMIN — GABAPENTIN 300 MG: 300 CAPSULE ORAL at 19:42

## 2021-06-03 RX ADMIN — OXYCODONE HYDROCHLORIDE 5 MG: 5 TABLET ORAL at 15:34

## 2021-06-03 RX ADMIN — SERTRALINE HYDROCHLORIDE 200 MG: 100 TABLET ORAL at 08:37

## 2021-06-03 RX ADMIN — OXYCODONE HYDROCHLORIDE 5 MG: 5 TABLET ORAL at 23:50

## 2021-06-03 RX ADMIN — ACETAMINOPHEN 650 MG: 325 TABLET, FILM COATED ORAL at 07:04

## 2021-06-03 RX ADMIN — ACETAMINOPHEN 650 MG: 325 TABLET, FILM COATED ORAL at 11:18

## 2021-06-03 RX ADMIN — CIPROFLOXACIN 400 MG: 2 INJECTION, SOLUTION INTRAVENOUS at 11:10

## 2021-06-03 RX ADMIN — ERTAPENEM SODIUM 1 G: 1 INJECTION, POWDER, LYOPHILIZED, FOR SOLUTION INTRAMUSCULAR; INTRAVENOUS at 13:44

## 2021-06-03 RX ADMIN — OXYCODONE HYDROCHLORIDE 5 MG: 5 TABLET ORAL at 11:18

## 2021-06-03 RX ADMIN — DIPHENHYDRAMINE HYDROCHLORIDE 25 MG: 25 CAPSULE ORAL at 17:37

## 2021-06-03 RX ADMIN — ONDANSETRON 4 MG: 4 TABLET, ORALLY DISINTEGRATING ORAL at 14:40

## 2021-06-03 RX ADMIN — GABAPENTIN 300 MG: 300 CAPSULE ORAL at 08:38

## 2021-06-03 RX ADMIN — OXYCODONE HYDROCHLORIDE 5 MG: 5 TABLET ORAL at 19:42

## 2021-06-03 RX ADMIN — DIPHENHYDRAMINE HYDROCHLORIDE 50 MG: 50 INJECTION INTRAMUSCULAR; INTRAVENOUS at 13:17

## 2021-06-03 RX ADMIN — ACETAMINOPHEN 650 MG: 325 TABLET, FILM COATED ORAL at 23:50

## 2021-06-03 RX ADMIN — CIPROFLOXACIN 400 MG: 2 INJECTION, SOLUTION INTRAVENOUS at 00:13

## 2021-06-03 RX ADMIN — TIZANIDINE 8 MG: 4 TABLET ORAL at 20:07

## 2021-06-03 RX ADMIN — METOPROLOL SUCCINATE 50 MG: 50 TABLET, EXTENDED RELEASE ORAL at 08:38

## 2021-06-03 RX ADMIN — NORTRIPTYLINE HYDROCHLORIDE 75 MG: 50 CAPSULE ORAL at 21:46

## 2021-06-03 RX ADMIN — ACETAMINOPHEN 650 MG: 325 TABLET, FILM COATED ORAL at 15:35

## 2021-06-03 RX ADMIN — ACETAMINOPHEN 650 MG: 325 TABLET, FILM COATED ORAL at 20:08

## 2021-06-03 RX ADMIN — OXYCODONE HYDROCHLORIDE 5 MG: 5 TABLET ORAL at 07:04

## 2021-06-03 ASSESSMENT — ACTIVITIES OF DAILY LIVING (ADL)
ADLS_ACUITY_SCORE: 11

## 2021-06-03 NOTE — PLAN OF CARE
A/O. VSS on RA. Complained of 4/10 surgical pain. PRN oxycodone and Tylenol provided. Pain decreased to a tolerable number, per pt. Left arm elevated and ice applied. CMS intact. Independent in room. Will continue with POC.

## 2021-06-03 NOTE — CONSULTS
Consult Date: 06/03/2021    INFECTIOUS DISEASE CONSULTATION    LOCATION:  355.    REFERRING PHYSICIAN:  Dr. Arevalo.    IMPRESSION:     1.  A 46-year-old female with a left hand dog bite, immediate infection, Post-procedure after closing of the wound, some lymphangitis.  Wound culture growing Strep viridans, lymphangitis would imply possible strep, but more likely organism here is still Pasteurella even though not yet isolated, minimal clinical sepsis, but on operation some signs of deeper infection.  2.  SIGNIFICANT ANTIBIOTIC ALLERGY HISTORY, CHILDHOOD PENICILLIN ALLERGY NOT RECHALLENGED, IN ADULTHOOD CEPHALOSPORINS WITH NO ALLERGIC REACTION, VANCOMYCIN SOUNDS LIKE HISTAMINE RELEASE TYPE SYNDROME, PROBABLY NOT TRUE ALLERGY AND SULFA WITH A RASH.    RECOMMENDATIONS:  Current antibiotics given the possibility this is either strep lymphangitis or significance to the Strep viridans is oral jose organisms in the Wound, not well covered with current agent.  Discontinue and switch to ertapenem.  Give a dose of Benadryl prior to the first dose, but likely to tolerate, set up for 2 weeks of outpatient IV antibiotics, midline placed, given the relatively poor oral choices overall here would do a full 2 weeks treating early tenosynovitis as well as the infection in general, if she reacts will have to reconsider options.  Discussed this all in detail with the patient.    HISTORY OF PRESENT ILLNESS:  This 46-year-old female is seen in consultation due to left hand infection and probable tenosynovitis.  The patient was bit by a dog in [place] went to the ER there, had the wound sutured shut, within a matter of hours had significant pain, swelling, redness at the site and in fact lymphangitis relatively early on.  She came in and has been admitted and underwent surgical I and D of the area per Dr. Awad, some signs of deeper infection.  The operative cultures are pending.  Drainage culture is growing Strep viridans.  She had  significant lymphangitis present.  The dog is under observation and not likely to be an issue with rabies, etc.  No significant major clinical sepsis.    PAST MEDICAL HISTORY:  Not much in the way of infections, recently had C. diff colitis in 2016 with no relapses or recurrences since that time.    ALLERGIES:  THE PENICILLIN GOES BACK TO ABOUT AGE 4, HAD A FULL BODY RASH AND REACTION AT THAT TIME, NEVER BEEN RECHALLENGED.  CEPHALOSPORINS IN HER 20S CAUSED A RASH.  VANCOMYCIN HAD WHAT SOUNDS LIKE A CLASSIC HISTAMINE RELEASE TYPE OF EVENT RATHER THAN A TRUE ALLERGY.  SULFA CAUSED A RASH.    SOCIAL AND FAMILY HISTORY:  No recent exposures, was in [place].  The dog is under observation.    REVIEW OF SYSTEMS:  Pain in the hand, but improved, feels okay in general actually had some flushing overnight, not a generalized allergic thing seen.    PHYSICAL EXAMINATION:    GENERAL:  The patient does look mildly flushed in the face, but with no generalized rash.  Doubt allergic reaction.  HEENT:  Otherwise, unremarkable.  HEART AND LUNGS:  Unremarkable.  ABDOMEN:  Nontender.  EXTREMITIES:  Left hand is wrapped.  Pictures noted.    LABORATORY DATA:  CRP was elevated.  Cultures of the wound growing Strep viridans, deep cultures are pending.    Thank you very much for the consultation.  I will follow the patient with you.    Walt Hooker MD        D: 2021   T: 2021   MT: DAYA    Name:     JERRELL SIERRA  MRN:      [id number]        Account:      [id number]   :      1975           Consult Date: 2021     Document: [id number]

## 2021-06-03 NOTE — PLAN OF CARE
VSS on RA. A/O. Independent. Oxycodone and Tylenol given for pain, see MAR. Zofran x1 for nausea with relief. Ice applied to left arm. CMS intact. Tolerating regular diet. Left hand wound soaked in Chlorhexidine soak and new dressing applied. Plan for Midline/PICC placement tomorrow to discharge home with IV abx. Will continue POC.

## 2021-06-03 NOTE — PROGRESS NOTES
Orthopedic Surgery  Santa Lundberg  6/3/2021  Admit Date:  2021  POD: 1 Day Post-Op   Procedure(s):  1.  Irrigation and debridement of skin, subcutaneous tissue, muscle, left palmar wound. 2.  Open carpal tunnel release with flexor tenosynovectomy.    Alert and oriented to person, place, and time.  Patient resting comfortably in bed.    Pain improving   Tolerating oral intake.    Denies nausea or vomiting  Denies chest pain or shortness of breath    Vital Sign Ranges  Temperature Temp  Av.7  F (36.5  C)  Min: 97.3  F (36.3  C)  Max: 98.2  F (36.8  C)   Blood pressure Systolic (24hrs), Av , Min:134 , Max:154        Diastolic (24hrs), Av, Min:65, Max:96      Pulse Pulse  Av.8  Min: 70  Max: 105   Respirations Resp  Av.9  Min: 8  Max: 20   Pulse oximetry SpO2  Av.9 %  Min: 92 %  Max: 98 %       Dressings: changed at bedside.  Packing pulled and wounds re-dressed.    Able to flex and extend fingers 2-5 without difficulty.  Mild pain with thumb flexion.   Full sensation to light touch  Radial pulse present  Capillary refill <2 seconds    Labs:  Recent Labs   Lab Test 21  0654 17  0655 17  0741   POTASSIUM 3.6 3.7 3.8     Recent Labs   Lab Test 21  0654 17  0655 17  0741   HGB 13.8 13.3 13.9     No results for input(s): INR in the last 21046 hours.  Recent Labs   Lab Test 21  0654 17  0655 17  0741    240 232       1. Plan:   Continue IV Abx per ID.  Likely will need extended course of IV abx given tendon involvement    Avoid /grasp left hand, gentle finger ROM ok.     Continue current pain regiment.   Dressings: Dr Awad requesting warm water/hibiclens hand soaks bid and then re-apply dressings.  Use splint if able       2. Disposition   Anticipate d/c to home once Abx plan established.      Belinda Emerson PA-C

## 2021-06-03 NOTE — PROGRESS NOTES
[facility]    Hospitalist Progress Note      Assessment & Plan   Santa Lundberg is a 46 year old female who was admitted on 6/1/2021.    Summary of Stay:   Santa Lundberg is a 46 year old female with PMH significant for chronic back pain secondary to spinal stenosis, migraine headaches, depression, hypertension, and GERD who presents as a direct admission from [place] [facility] room for further treatment of a dog bite of her left hand with associated cellulitis.      Santa Lundberg is a 46 year old female who presents for evaluation of worsening left hand dog bite with cellulitis.  The patient states that around 12:30 AM on 5/31 she was bit by her nephew's dog that is an adopted mixed pit bull up-to-date on his vaccinations.  The dog bit her on her left palm.  She is uncertain the reason the dog bit her and reports it did not seem agitated prior to the bite.  The patient was at her cabin in [place] when this happened and went to a local hospital.  At the hospital she received a tetanus shot, had stitches placed in her palm, and started on a course of oral doxycycline based on her antibiotic allergies.  She reports that her hand was soaked in an antibiotic solution prior to stitches being placed.  She has taken 3 doses of the doxycycline.  Over the last day the patient has felt febrile without chills or diaphoresis, nauseated without vomiting, increased pain in her left hand that she describes as throbbing, and onset of erythema with worsening swelling of her left hand.  She reports the redness started spreading up her arm today which was concerning to her.  She denies associated numbness or tingling.  She notes serosanguineous drainage from the bite and denies purulent drainage.     Patient has multiple allergies.  She was admitted to our hospital as a direct admission from urgency room.  Started on IV Cipro and Flagyl.  Hand surgery team consulted.     Patient is  right-handed.    Plan:    Left hand animal bite.  Skin and soft tissue infection.  Early tenosynovitis.  -Patient underwent exploration, incision and drainage on 6/2/2021.  Possible early tenosynovitis of the left thumb flexor tendon.  -Multiple allergies.  ID consulted: Patient has been started on IV ertapenem.  Will need it for 2 weeks.  So will need midline/PICC line.  -Has multiple antibiotic allergies including allergies to penicillin and Keflex.  Patient to receive Benadryl before ertapenem.  Monitor for any allergies.  She received 1 dose of ertapenem at [place] urgency room before coming to our hospital and did not have any issue.  -Today, she did have some skin redness (which  started before getting ertapenem).  Cipro and Flagyl have been stopped.  -Continue overnight monitoring for possible allergy.  -PICC line/midline placement tomorrow and then possible discharge.  -Culture from operating room also sent.  Pending.     Hypertension  -Oral metoprolol as before     Depression  -PTA sertraline and gabapentin     Migraine  -On nortriptyline.        DVT Prophylaxis: Pneumatic Compression Devices  Code Status: Full Code  Expected discharge: 1-2 days    Héctor Arevalo MD  Text Page (7am - 6pm, M-F)    Interval History   Patient was evaluated with nursing staff. Overnight issues discussed.    Review of systems:  No nausea or vomiting.  No abdominal pain.  No diarrhea.  No chest pain/palpitations.  No new cough/shortness of breath.  No headache/visual disturbance/new weakness.    -Data reviewed today: Labs and medications.    Physical Exam   Temp: 97.3  F (36.3  C) Temp src: Oral BP: (!) 141/95 Pulse: 101   Resp: 18 SpO2: 97 % O2 Device: None (Room air) Oxygen Delivery: 2 LPM  There were no vitals filed for this visit.  Vital Signs with Ranges  Temp:  [97.3  F (36.3  C)-98.2  F (36.8  C)] 97.3  F (36.3  C)  Pulse:  [] 101  Resp:  [8-20] 18  BP: (134-154)/(65-96) 141/95  SpO2:  [92 %-98 %] 97 %  I/O last 3  completed shifts:  In: 500 [I.V.:500]  Out: 2 [Blood:2]    Constitutional: Awake, alert, cooperative, no apparent distress  HEENT: Trachea midline, sclera is clear   Respiratory: No crackles. No wheezing. Equal breath sounds bilaterally.  Cardiovascular: Regular rate and rhythm, normal S1 and S2, and no murmur noted  GI: Normal bowel sounds, soft, non-distended, non-tender  Extremities: Left hand is in a splint.  Normal sensation of the tip of the fingers, well perfused.    Medications       ertapenem (INVanz) IV  1 g Intravenous Q24H     gabapentin  300 mg Oral TID     metoprolol succinate ER  50 mg Oral QAM     nortriptyline  75 mg Oral At Bedtime     sertraline  200 mg Oral QAM     sodium chloride (PF)  3 mL Intracatheter Q8H       Data   Recent Labs   Lab 06/02/21  0654   WBC 5.4   HGB 13.8   MCV 90         POTASSIUM 3.6   CHLORIDE 104   CO2 27   BUN 6*   CR 0.67   ANIONGAP 5   GINA 8.4*   *       No results found for this or any previous visit (from the past 24 hour(s)).

## 2021-06-03 NOTE — ANESTHESIA POSTPROCEDURE EVALUATION
Patient: Santa Lundberg    Procedure(s):  IRRIGATION AND DEBRIDEMENT, Valenzuela dog bite, and carpal tunnel release    Diagnosis:Dog bite of left hand [S61.452A, W54.0XXA]  Diagnosis Additional Information: No value filed.    Anesthesia Type:  General    Note:  Disposition: Inpatient   Postop Pain Control: Uneventful            Sign Out: Well controlled pain   PONV: No   Neuro/Psych: Uneventful            Sign Out: Acceptable/Baseline neuro status   Airway/Respiratory: Uneventful            Sign Out: Acceptable/Baseline resp. status   CV/Hemodynamics: Uneventful            Sign Out: Acceptable CV status; No obvious hypovolemia; No obvious fluid overload   Other NRE:    DID A NON-ROUTINE EVENT OCCUR?            Last vitals:  Vitals:    06/02/21 1750 06/02/21 1755 06/02/21 1800   BP:   (!) 147/89   Pulse: 91 95 102   Resp: 8 9 9   Temp:      SpO2: 95% 95% 96%       Last vitals prior to Anesthesia Care Transfer:  CRNA VITALS  6/2/2021 1625 - 6/2/2021 1725      6/2/2021             NIBP:  122/81    Pulse:  90    NIBP Mean:  95    SpO2:  100 %          Electronically Signed By: Tanmay San MD  June 2, 2021  7:28 PM

## 2021-06-03 NOTE — PLAN OF CARE
BP (!) 141/65   Pulse 97   Temp 98.2  F (36.8  C) (Oral)   Resp 16   SpO2 93% Alert and oriented x4, VSS, 93% on RA. Afebrile. LS clear and equal bilaterally. Passing gas. no BM. Denies N/V. Adequate urine output.   Dressing to L hand clean dry intact. PT endorsed pain 6/10 with relief from oxycodone and tylenol. Hand elevated on pillows throughout shift for comfort. Continue with PoC.

## 2021-06-03 NOTE — CONSULTS
ID consult dictated IMP 1 47 yo female dog bite related L hand infection, minimal sepsis, but some teno likely SP I and D, prob paturella but strep in cx and lymphangitis    REC erta, benadryl 1st dose. 2 weeks, poot po options due to allergy make all IV logical

## 2021-06-03 NOTE — OP NOTE
Procedure Date: 06/02/2021    PREOPERATIVE DIAGNOSIS:  Left palmar laceration from a dog bite with infection.    POSTOPERATIVE DIAGNOSIS:  Left palmar laceration with early flexor tenosynovitis.    PROCEDURES:    1.  Irrigation and debridement of skin, subcutaneous tissue, muscle, left palmar wound.  2.  Open carpal tunnel release with flexor tenosynovectomy.    SURGEON:  Livier Awad MD    ANESTHESIA:  General.    INDICATIONS:  The patient sustained a dog bite injury on the 05/31/2021.  She was seen at a [place] urgent care and the wound was examined and closed.  With swelling, the sutures became tight.  She had increasing pain and ascending cellulitis.  She was admitted to [facility].  The sutures were removed.  There was no obvious purulent drainage.  Cultures were obtained at the bedside and are pending.  She was followed overnight.  There was some improvement in her proximal cellulitis, but her hand remained swollen and she has limited digital range of motion with swelling extending into the digits and some early dorsal erythema.  With her persistent clinical presentation, surgery was recommended.  The potential for extensile incisions to address the proximal infection in the flexor tendon space was reviewed.  She understands and agrees to proceed.    DESCRIPTION OF PROCEDURE:  The patient was brought to the operating room.  General anesthesia was induced without difficulty.  Her left upper extremity was prepped and draped below an upper arm tourniquet.  A pause was performed.  We confirmed the site and the procedure planned.    Initially, the palmar laceration measuring 3 cm was explored.  The skin edges were sharply debrided with a 15 blade.  Subcutaneous tissues were also sharply excised with a 15 blade.  The muscle was exposed and there was some necrotic muscle tissue, which was sharply debrided with a 15 blade to healthy margins.  Once the debridement was performed, the wound was inspected.  The  injury clearly extended deeply.  Initially, it did not appear to involve the flexor tendon sheath, but with further exploration, the sheath was exposed more on the deep margin of the actual traumatic wound.  More concerning was some serous fluid and a small hemorrhage within the tendon sheath.  She did not have significant clinical involvement of the flexor tendon of the thumb, but did have some digital stiffness and swelling.  With the findings, the carpal tunnel space was opened to evaluate the flexor tendons further.  Skin and subcutaneous tissues were divided.  The transverse carpal ligament was released and the contents of the carpal tunnel were exposed.  There was adequate visualization and the nerve was healthy in appearance.  There was some fluid within the flexor tendon of the thumb and more synovitis that is typically found in the superficialis tendons.  The profundus tendons were relatively normal in appearance on the ulnar aspect of the carpal tunnel.  A tenosynovectomy was performed of the superficialis tendons.  There was no evidence of purulence or significant fluid.  More of an early tendon process by its appearance.  Once the two wounds were debrided.  The wounds were then irrigated copiously with a liter of Betadine wash.  An additional liter of saline was then used to irrigate both sites.  The tourniquet was deflated and hemostasis was achieved with pressure and bipolar cautery.  The carpal tunnel incision was closed with interrupted Prolene suture.  The left thumb wound was approximated over a deep gauze pack with a Prolene suture.     A bulky sterile dressing was applied incorporating a thumb spica splint.  She tolerated the procedure well and was taken to the recovery room in satisfactory condition.     Livier Awad MD        D: 2021   T: 2021   MT: ELIZABETH    Name:     JERRELL SIERRAN:      [id number]        Account:        [id number]   :      1975            Procedure Date: 06/02/2021     Document: [id number]

## 2021-06-04 VITALS
SYSTOLIC BLOOD PRESSURE: 184 MMHG | RESPIRATION RATE: 16 BRPM | TEMPERATURE: 98.5 F | DIASTOLIC BLOOD PRESSURE: 82 MMHG | HEART RATE: 90 BPM | OXYGEN SATURATION: 94 %

## 2021-06-04 PROCEDURE — 250N000011 HC RX IP 250 OP 636: Performed by: INTERNAL MEDICINE

## 2021-06-04 PROCEDURE — 99239 HOSP IP/OBS DSCHRG MGMT >30: CPT | Performed by: INTERNAL MEDICINE

## 2021-06-04 PROCEDURE — 250N000013 HC RX MED GY IP 250 OP 250 PS 637: Performed by: PHYSICIAN ASSISTANT

## 2021-06-04 PROCEDURE — 250N000011 HC RX IP 250 OP 636: Performed by: ORTHOPAEDIC SURGERY

## 2021-06-04 PROCEDURE — 36569 INSJ PICC 5 YR+ W/O IMAGING: CPT

## 2021-06-04 PROCEDURE — 250N000013 HC RX MED GY IP 250 OP 250 PS 637: Performed by: INTERNAL MEDICINE

## 2021-06-04 PROCEDURE — 250N000013 HC RX MED GY IP 250 OP 250 PS 637: Performed by: ORTHOPAEDIC SURGERY

## 2021-06-04 PROCEDURE — 258N000003 HC RX IP 258 OP 636: Performed by: INTERNAL MEDICINE

## 2021-06-04 PROCEDURE — 272N000433 HC KIT CATH IV 18 OR 20G CM, POWERGLIDE W MAX BARRIER

## 2021-06-04 RX ORDER — DIPHENHYDRAMINE HYDROCHLORIDE, ZINC ACETATE 2; .1 G/100G; G/100G
CREAM TOPICAL 3 TIMES DAILY PRN
Qty: 15 G | Refills: 0 | Status: SHIPPED | OUTPATIENT
Start: 2021-06-04

## 2021-06-04 RX ORDER — DIPHENHYDRAMINE HCL 25 MG
25 CAPSULE ORAL EVERY 6 HOURS PRN
Qty: 14 CAPSULE | Refills: 0 | Status: SHIPPED | OUTPATIENT
Start: 2021-06-04

## 2021-06-04 RX ADMIN — ACETAMINOPHEN 650 MG: 325 TABLET, FILM COATED ORAL at 09:50

## 2021-06-04 RX ADMIN — ACETAMINOPHEN 650 MG: 325 TABLET, FILM COATED ORAL at 05:36

## 2021-06-04 RX ADMIN — METOPROLOL SUCCINATE 50 MG: 50 TABLET, EXTENDED RELEASE ORAL at 09:34

## 2021-06-04 RX ADMIN — ERTAPENEM SODIUM 1 G: 1 INJECTION, POWDER, LYOPHILIZED, FOR SOLUTION INTRAMUSCULAR; INTRAVENOUS at 12:18

## 2021-06-04 RX ADMIN — ONDANSETRON 4 MG: 4 TABLET, ORALLY DISINTEGRATING ORAL at 14:12

## 2021-06-04 RX ADMIN — DIPHENHYDRAMINE HYDROCHLORIDE 25 MG: 25 CAPSULE ORAL at 12:04

## 2021-06-04 RX ADMIN — SERTRALINE HYDROCHLORIDE 200 MG: 100 TABLET ORAL at 09:34

## 2021-06-04 RX ADMIN — SODIUM CHLORIDE, POTASSIUM CHLORIDE, SODIUM LACTATE AND CALCIUM CHLORIDE 1000 ML: 600; 310; 30; 20 INJECTION, SOLUTION INTRAVENOUS at 00:11

## 2021-06-04 RX ADMIN — GABAPENTIN 300 MG: 300 CAPSULE ORAL at 09:33

## 2021-06-04 RX ADMIN — OXYCODONE HYDROCHLORIDE 5 MG: 5 TABLET ORAL at 09:50

## 2021-06-04 RX ADMIN — OXYCODONE HYDROCHLORIDE 5 MG: 5 TABLET ORAL at 05:37

## 2021-06-04 RX ADMIN — OXYCODONE HYDROCHLORIDE 5 MG: 5 TABLET ORAL at 14:12

## 2021-06-04 ASSESSMENT — ACTIVITIES OF DAILY LIVING (ADL)
ADLS_ACUITY_SCORE: 11

## 2021-06-04 NOTE — CONSULTS
Care Management Initial Consult    General Information  Assessment completed with: Patient,    Type of CM/SW Visit: Offer D/C Planning    Primary Care Provider verified and updated as needed: Yes   Readmission within the last 30 days: no previous admission in last 30 days      Reason for Consult: care coordination/care conference, discharge planning    Communication Assessment  Patient's communication style: spoken language (English or Bilingual)    Hearing Difficulty or Deaf: no   Wear Glasses or Blind: yes    Cognitive  Cognitive/Neuro/Behavioral: WDL                      Living Environment:   People in home: spouse     Current living Arrangements: house      Able to return to prior arrangements: yes       Family/Social Support:  Care provided by: self  Provides care for: no one  Marital Status:             Description of Support System: Supportive, Involved    Support Assessment: Adequate family and caregiver support    Current Resources:   Patient receiving home care services: No     Community Resources: Home Infusion  Equipment currently used at home: none  Supplies currently used at home: None    Lifestyle & Psychosocial Needs:        Socioeconomic History     Marital status:      Spouse name: Not on file     Number of children: Not on file     Years of education: Not on file     Highest education level: Not on file     Tobacco Use     Smoking status: Never Smoker     Smokeless tobacco: Never Used   Substance and Sexual Activity     Alcohol use: Yes     Comment: occasional     Drug use: No     Additional Information:    CM consulted to arrange OP IV antibiotic plan for pt. ID has ordered IV Invanz 1G once daily for 14 days. Met w/ pt at bedside to discuss outpatient infusion vs home infusion, pt elects to move forward with home infusion at this time. E-mail referral sent to [facility] who will check benefits, follow-up w/ pt and offer choice. Pt denies having any other discharge needs at this  time. Vascular access RN was preparing to place pt's PICC line as CM was updating bedside RN with plan.     Will continue to follow patient for any additional discharge needs.     Renee Lund RN BSN   Inpatient Care Coordination  [facility]   Phone [phone]

## 2021-06-04 NOTE — DISCHARGE SUMMARY
[facility]  Discharge Summary  Hospitalist      Date of Admission:  6/1/2021  Date of Discharge:  6/4/2021  Provider:  Nanette Yi MD  Date of Service (when I last saw the patient): 06/04/21      Primary Provider: Umesh Olivia          Discharge Diagnosis:   Discharge Diagnoses   Left hand animal bite skin and soft tissue infection early tenosynovitis status post I&D  Hypertension   Migraine Depression        Other medical issues:  Past Medical History:   Diagnosis Date     Kidney stone     9mm, required surgical removal          History of Present Illness   Santa Lundberg is an 46 year old female who presented with worsening left hand pain and swelling please see the admission history and physical for full details.    Hospital Course     Santa Lundberg was admitted on 6/1/2021. She is a 46 year old female with PMH significant for chronic back pain secondary to spinal stenosis, migraine headaches, depression, hypertension, and GERD who presents as a direct admission from [facility] room for further treatment of a dog bite of her left hand with associated cellulitis.      She presented for evaluation of worsening left hand dog bite with cellulitis.  She was seen at a local hospital at the hospital she received a tetanus shot, had stitches placed in her palm, and started on a course of oral doxycycline based on her antibiotic allergies.  Despite oral antibiotics she had worsening pain swelling and fever with onset of erythema with worsening swelling of her left hand. and she noted serosanguineous drainage from the bite and denies purulent drainage.  She was initially evaluated at emergency room and transferred as a direct admit for IV antibiotics as she failed oral antibiotics.  Patient had multiple allergies.  ID was consulted.  She underwent I&D orthopedic surgery was consulted and followed during hospitalization.  Will need follow-up as outpatient for wound care.  ID  recommended antibiotics because of her significant allergy reaction she will need IV antibiotics for 2 weeks for early tenosynovitis.    Midline was placed she will receive IV antibiotic as outpatient.  Blood pressure was intermittently elevated during hospitalization attribute it to pain and reactions.  Patient will need to closely monitor blood pressure and follow with primary care provider for further adjustment of treatment as necessary    Prior to hospitalization medications for depression and migraine were continued.      Significant Results and Procedures   As noted above    Pending Results   Unresulted Labs Ordered in the Past 30 Days of this Admission     Date and Time Order Name Status Description    6/2/2021 1653 Tissue Culture Aerobic Bacterial Preliminary     6/2/2021 1653 Anaerobic bacterial culture Preliminary           Code Status   Full Code       Primary Care Physician   Umesh Olivia    Physical Exam   Temp: 98.5  F (36.9  C) Temp src: Oral BP: (!) 184/82 Pulse: 90   Resp: 16 SpO2: 94 % O2 Device: None (Room air) Oxygen Delivery: 2 LPM  There were no vitals filed for this visit.  Vital Signs with Ranges  Temp:  [98.2  F (36.8  C)-100.2  F (37.9  C)] 98.5  F (36.9  C)  Pulse:  [77-95] 90  Resp:  [12-18] 16  BP: ()/(47-88) 184/82  SpO2:  [86 %-98 %] 94 %  I/O last 3 completed shifts:  In: 120 [P.O.:120]  Out: -     Constitutional: Awake, alert, cooperative, no apparent distress  HEENT: Trachea midline, sclera is clear   Respiratory: No crackles. No wheezing. Equal breath sounds bilaterally.  Cardiovascular: Regular rate and rhythm, normal S1 and S2, and no murmur noted  GI: Normal bowel sounds, soft, non-distended, non-tender  Extremities: Left hand is in a splint.  Normal sensation of the tip of the fingers, well perfused.         Discharge Disposition   Discharged to home    Consultations This Hospital Stay   ORTHOPEDIC SURGERY IP CONSULT  INFECTIOUS DISEASES IP CONSULT  VASCULAR ACCESS  ADULT IP CONSULT  SOCIAL WORK IP CONSULT  CARE MANAGEMENT / SOCIAL WORK IP CONSULT    Time Spent on this Encounter   I, Nanette Yi MD, personally saw the patient today and spent greater than 30 minutes discharging this patient.    Discharge Orders      Reason for your hospital stay    Left hand infection     Follow-up and recommended labs and tests    Follow up with Dr. Awad 1 weeks after surgery.  Call [phone] for appointment and questions.     Activity    Your activity upon discharge: No heavy lifting with left hand until ok by surgeon     Wound care and dressings    Instructions to care for your wound at home: Left hand:  hand soaks two times per day in warm water/hibiclens x 20 minutes.  Then cover wounds with adaptic, gauze, stretch (rachel) wrap.  Re-use splint prn.     MD face to face encounter    Documentation of Face to Face and Certification for Home Health Services    I certify that patient: Santa Lundberg is under my care and that I, or a nurse practitioner or physician's assistant working with me, had a face-to-face encounter that meets the physician face-to-face encounter requirements with this patient on: 6/3/2021.    This encounter with the patient was in whole, or in part, for the following medical condition, which is the primary reason for home health care: Need for IV Abx.    I certify that, based on my findings, the following services are medically necessary home health services: Infusion nurse/nursing    My clinical findings support the need for the above services because: Nurse is needed: For complex aftercare of surgical procedures because the patient needs instruction and cannot perform care on their own due to: need for IV Abx. and To assess wound     Further, I certify that my clinical findings support that this patient is homebound (i.e. absences from home require considerable and taxing effort and are for medical reasons or Religion services or infrequently or of short  duration when for other reasons) because: Leaving home is medically contraindicated for the following reason(s): Dyspnea on exertion that makes it so they cannot leave their home for needed services without clinical deterioration. and Unable to tolerate sitting for more than 15 minutes.. and Requires assistance of another person or specialized equipment to access medical services because patient: Has prohibitive pain during ambulation., Is unable to operate assistive equipment on their own. and Requires supervision of another for safe transfer...    Based on the above findings. I certify that this patient is confined to the home and needs intermittent skilled nursing care, physical therapy and/or speech therapy.  The patient is under my care, and I have initiated the establishment of the plan of care.  This patient will be followed by a physician who will periodically review the plan of care.  Physician/Provider to provide follow up care: Umesh Olivia    Attending hospital physician (the Medicare certified [facility] provider): Héctor Arevalo MD  Physician Signature: See electronic signature associated with these discharge orders.  Date: 6/3/2021     Reason for your hospital stay    Please refer to discharge summary.  Briefly  admitted with an infection after a dog bite requiring IV antibiotics due to significant history of allergic reactions     Follow-up and recommended labs and tests     Follow-up with orthopedic for wound check and continue wound care as directed  Follow with primary care provider for her after hospital discharge follow-up.  Please keep record of blood pressure primary care provider to adjust blood pressure medications as recommended.  Blood pressure was occasionally higher during hospitalization  Please call or come if there are any new or worsening symptoms  Outpatient IV antibiotics for next 2 weeks as prescribed     Activity    Your activity upon discharge: activity as tolerated     Monitor and  record    blood pressure daily     Full Code     Diet    Follow this diet upon discharge: Orders Placed This Encounter      Advance Diet as Tolerated: Regular Diet Adult       Diet    Follow this diet upon discharge: Orders Placed This Encounter      Advance Diet as Tolerated: Regular Diet Adult      Diet     Discharge Medications   Current Discharge Medication List      START taking these medications    Details   diphenhydrAMINE (BENADRYL) 25 MG capsule Take 1 capsule (25 mg) by mouth every 6 hours as needed for itching or allergies (prior to antibiotics)  Qty: 14 capsule, Refills: 0    Comments: Please do not drive as ity can make you sleepy, donot operate heavy machinery, donot mix with alcohol  Associated Diagnoses: Allergic reaction, subsequent encounter      diphenhydrAMINE-zinc acetate (BENADRYL) 2-0.1 % external cream Apply topically 3 times daily as needed for itching  Qty: 15 g, Refills: 0    Associated Diagnoses: Allergic reaction, subsequent encounter      ertapenem (INVANZ) 1 GM injection Inject 1 g into the vein every 24 hours for 14 days ESR,CRP,CBC with differential, creatinine, SGOT weekly while on this medication to be faxed to Dr. Hooker office.  Qty: 140 mL, Refills: 0    Associated Diagnoses: Dog bite of left hand, initial encounter      ondansetron (ZOFRAN-ODT) 4 MG ODT tab Take 1 tablet (4 mg) by mouth every 6 hours as needed for nausea or vomiting  Qty: 8 tablet, Refills: 0    Associated Diagnoses: Dog bite of left hand, initial encounter      oxyCODONE (ROXICODONE) 5 MG tablet Take 1-2 tablets (5-10 mg) by mouth every 4 hours as needed for moderate to severe pain  Qty: 25 tablet, Refills: 0    Associated Diagnoses: Dog bite of left hand, initial encounter      senna-docusate (SENOKOT-S/PERICOLACE) 8.6-50 MG tablet Take 1 tablet by mouth 2 times daily as needed for constipation  Qty: 20 tablet, Refills: 0    Associated Diagnoses: Dog bite of left hand, initial encounter         CONTINUE these  medications which have CHANGED    Details   ibuprofen (ADVIL/MOTRIN) 600 MG tablet Take 1 tablet (600 mg) by mouth every 6 hours as needed for moderate pain  Qty: 20 tablet, Refills: 0    Associated Diagnoses: Dog bite of left hand, initial encounter         CONTINUE these medications which have NOT CHANGED    Details   gabapentin (NEURONTIN) 300 MG capsule Take 300 mg by mouth 3 times daily      LORazepam (ATIVAN) 1 MG tablet Take 0.5-1 mg by mouth daily as needed for anxiety      metoprolol succinate ER (TOPROL-XL) 50 MG 24 hr tablet Take 50 mg by mouth every morning      nortriptyline (PAMELOR) 75 MG capsule Take 75 mg by mouth At Bedtime      omeprazole (PRILOSEC) 20 MG DR capsule Take 20 mg by mouth daily as needed      rizatriptan (MAXALT) 10 MG tablet Take 10 mg by mouth at onset of headache for migraine      sertraline (ZOLOFT) 100 MG tablet Take 200 mg by mouth every morning      tiZANidine (ZANAFLEX) 4 MG tablet Take 4-8 mg by mouth every 6 hours as needed for muscle spasms      acetaminophen (TYLENOL) 325 MG tablet Take 2 tablets (650 mg) by mouth every 4 hours as needed for mild pain  Qty: 100 tablet           Allergies   Allergies   Allergen Reactions     Bactrim [Sulfamethoxazole W-Trimethoprim]      Keflex [Cephalexin] Unknown     Penicillins      Rash and vomiting     Vancomycin Itching     Hot flashes     Data   Most Recent 3 CBC's:  Recent Labs   Lab Test 06/02/21  0654 01/02/17  0655 01/01/17  0741   WBC 5.4 10.2 12.0*   HGB 13.8 13.3 13.9   MCV 90 90 91    240 232      Most Recent 3 BMP's:  Recent Labs   Lab Test 06/02/21  0654 01/02/17  0655 01/01/17  0741    139 138   POTASSIUM 3.6 3.7 3.8   CHLORIDE 104 104 105   CO2 27 28 26   BUN 6* 6* 9   CR 0.67 0.70 0.73   ANIONGAP 5 7 7   GINA 8.4* 8.3* 8.4*   * 94 85     Most Recent 2 LFT's:  Recent Labs   Lab Test 12/31/16  0314   AST 41   *   ALKPHOS 63   BILITOTAL 1.1     Most Recent INR's and Anticoagulation Dosing  History:  Anticoagulation Dose History     Recent Dosing and Labs Latest Ref Rng & Units 10/17/2012    INR 0.86 - 1.14 0.97        Most Recent 3 Troponin's:No lab results found.  Most Recent Cholesterol Panel:No lab results found.  Most Recent 6 Bacteria Isolates From Any Culture (See EPIC Reports for Culture Details):  Recent Labs   Lab Test 06/02/21  1611 06/01/21  1915   CULT Light growth  Streptococcus viridans group  Speciation in progress  *  This organism is susceptible to ampicillin, penicillin, vancomycin and the cephalosporins.   If treatment is required AND your patient is allergic to penicillin, contact the   Microbiology Lab within 5 days to request susceptibility testing.    Culture negative monitoring continues Light growth  Streptococcus viridans group  Identification obtained by MALDI-TOF mass spectrometry research use only database. Test   characteristics determined and verified by the Infectious Diseases Diagnostic Laboratory   ([facility]) [place], [place].  Susceptibility testing not routinely done  *     Most Recent TSH, T4 and A1c Labs:No lab results found.  Results for orders placed or performed in visit on 06/13/18   XR Sacroiliac Therapeutic Injection Left    Narrative    This exam was marked as non-reportable because it will not be read by a   radiologist or a [place] non-radiologist provider.                       Disclaimer: This note consists of symbols derived from keyboarding, dictation and/or voice recognition software. As a result, there may be errors in the script that have gone undetected. Please consider this when interpreting information found in this chart.

## 2021-06-04 NOTE — PROGRESS NOTES
[facility]  Infectious Disease Progress Note          Assessment and Plan:   IMPRESSION:     1.  A 46-year-old female with a left hand dog bite, immediate infection, Post-procedure after closing of the wound, some lymphangitis.  Wound culture growing Strep viridans, lymphangitis would imply possible strep, but more likely organism here is still Pasteurella even though not yet isolated, minimal clinical sepsis, but on operation some signs of deeper infection.  2.  SIGNIFICANT ANTIBIOTIC ALLERGY HISTORY, CHILDHOOD PENICILLIN ALLERGY NOT RECHALLENGED, IN ADULTHOOD CEPHALOSPORINS WITH NO ALLERGIC REACTION, VANCOMYCIN SOUNDS LIKE HISTAMINE RELEASE TYPE SYNDROME, PROBABLY NOT TRUE ALLERGY AND SULFA WITH A RASH.     RECOMMENDATIONS:  1 Current antibiotics,  given the possibility this is either beta strep lymphangitis, or significance to the Strep viridans as oral joes organisms in the Wound, not well covered with current agent.   ertapenem.  Give a dose of Benadryl prior to the first dose, did tolerate, set up for 2 weeks of outpatient IV antibiotics, midline placed, given the relatively poor oral choices overall here would do a full 2 weeks treating early tenosynovitis as well as the infection in general, if she reacts will have to reconsider options.  Discussed this all in detail with the patient.        Interval History:   no new complaints and doing well; no cp, sob, n/v/d, or abd pain. Hand seen red but overall OK  cxs same              Medications:       ertapenem (INVanz) IV  1 g Intravenous Q24H     gabapentin  300 mg Oral TID     metoprolol succinate ER  50 mg Oral QAM     nortriptyline  75 mg Oral At Bedtime     sertraline  200 mg Oral QAM     sodium chloride (PF)  10 mL Intracatheter Q8H     sodium chloride (PF)  3 mL Intracatheter Q8H                  Physical Exam:   Blood pressure (!) 184/82, pulse 90, temperature 98.5  F (36.9  C), temperature source Oral, resp. rate 16, SpO2 94  %.  Wt Readings from Last 2 Encounters:   12/31/16 82 kg (180 lb 11.2 oz)   10/20/13 81.6 kg (180 lb)     Vital Signs with Ranges  Temp:  [98.2  F (36.8  C)-100.2  F (37.9  C)] 98.5  F (36.9  C)  Pulse:  [] 90  Resp:  [12-18] 16  BP: ()/(47-95) 184/82  SpO2:  [86 %-98 %] 94 %    Constitutional: Awake, alert, cooperative, no apparent distress   Lungs: Clear to auscultation bilaterally, no crackles or wheezing   Cardiovascular: Regular rate and rhythm, normal S1 and S2, and no murmur noted   Abdomen: Normal bowel sounds, soft, non-distended, non-tender   Skin: No rashes, no cyanosis, no edema   Other:           Data:   All microbiology laboratory data reviewed.  Recent Labs   Lab Test 06/02/21  0654 01/02/17  0655 01/01/17  0741   WBC 5.4 10.2 12.0*   HGB 13.8 13.3 13.9   HCT 43.2 40.3 42.1   MCV 90 90 91    240 232     Recent Labs   Lab Test 06/02/21  0654 01/02/17  0655 01/01/17  0741   CR 0.67 0.70 0.73     No lab results found.  Recent Labs   Lab Test 06/02/21  1611 06/01/21  1915   CULT Culture in progress  Culture negative monitoring continues Light growth  Streptococcus viridans group  Identification obtained by MALDI-TOF mass spectrometry research use only database. Test   characteristics determined and verified by the Infectious Diseases Diagnostic Laboratory   ([facility]) [place], [place].  Susceptibility testing not routinely done  *

## 2021-06-04 NOTE — PROCEDURES
[facility]    Single Lumen Midline Placement    Date/Time: 6/4/2021 9:00 AM  Performed by: Trenton Casas RN  Authorized by: Walt Hooker MD   Indications: vascular access    UNIVERSAL PROTOCOL   Site Marked: Yes  Prior Images Obtained and Reviewed:  Yes  Required items: Required blood products, implants, devices and special equipment available    Patient identity confirmed:  Verbally with patient, arm band and hospital-assigned identification number  Patient was reevaluated immediately before administering moderate or deep sedation or anesthesia  Confirmation Checklist:  Patient's identity using two indicators, relevant allergies, procedure was appropriate and matched the consent or emergent situation and correct equipment/implants were available  Time out: Immediately prior to the procedure a time out was called    Universal Protocol: the Joint Commission Universal Protocol was followed    Preparation: Patient was prepped and draped in usual sterile fashion           ANESTHESIA    Anesthesia: Local infiltration  Local Anesthetic:  Lidocaine 1% without epinephrine  Anesthetic Total (mL):  1      SEDATION    Patient Sedated: No        Preparation: skin prepped with 2% chlorhexidine  Skin prep agent: skin prep agent completely dried prior to procedure  Sterile barriers: maximum sterile barriers were used: cap, mask, sterile gown, sterile gloves, and large sterile sheet  Hand hygiene: hand hygiene performed prior to central venous catheter insertion  Type of line used: Midline  Catheter type: single lumen  Lumen type: non-valved  Catheter size: 20g.  Brand: [employer]  Lot number: [id number]  Placement method: venipuncture and ultrasound  Number of attempts: 1  Successful placement: yes  Orientation: right  Location: cephalic vein (5.4mm)  Arm circumference: adults 10 cm  Extremity circumference: 33  Visible catheter length: 0  Internal length: 8 cm  Total catheter length: 8  Dressing and securement:  blood cleaned with CHG, blood removed, chlorhexidine disc applied, occlusive dressing applied, statlock and sterile dressing applied  Post procedure assessment: blood return through all ports  PROCEDURE   Patient Tolerance:  Patient tolerated the procedure well with no immediate complications     Midline ok to use, Gris Sandy RN informed

## 2021-06-04 NOTE — PROVIDER NOTIFICATION
patient now has a rash to the right hand as well. Do you want to look at or should I continue with abx?  Spoke to ID. Continue as planned for now. Give benadryl as ordered.

## 2021-06-04 NOTE — PROGRESS NOTES
Ortho Hand    POD#2  100.2 max VSS    Hand exam:    Swelling digits much improved with good ROM  Erythema and vesicular/raised rxn in area of previous cellulitis  No new findings.  Tolerating IV antibiotics    Plan:  OK for discharge   2 wks Ertapenem  F/u [facility] for wound check  9am  Soaks BID.  Light dressing.  Work on ROM    Lissa Awad  [phone]

## 2021-06-04 NOTE — PLAN OF CARE
Tele: NA   Vitals: BP 96/53 (BP Location: Right arm)   Pulse 85   Temp 98.2  F (36.8  C) (Oral)   Resp 12   SpO2 92%   Pain: Reports pain in L hand as well as chronic back pain/spasms. Obtained order for PRN tizanadine per pt request. PRN oxycodone also given which was helpful. Pt continues to utilize ice packs to L hand as well.  Neuro: A&Ox4  Cardiac: Denies CP overnight  Resp: LS clear. No SOB. Intermittently desats to 86-88% while sleeping - 2L NC applied overnight.  GI/: Voiding.  Skin: CHG soak and wound care performed to L hand. +2-3 edema to L hand. L hand continues to appear erythematic.  Mobility: Up ad mundo w/ steady gait.  Misc: New PIV placed overnight. Temp overnight of 100.2. PRN tylenol given. Pt also had soft BP overnight (84/47) and was otherwise asymptomatic. MD notified and order given for 1000cc bolus of LR. BPs somewhat improved after bolus but remain soft w/ SBPs in 90s.  Plan: Continue POC. Possible midline placement today in anticipation of ongoing IV abx.

## 2021-06-04 NOTE — PROGRESS NOTES
[facility]    Received referral for home IV abx. Pt has coverage for IV abx through her [employer] plan. She has a deductible of $750 (met $37.89). She has an 80/20 coverage. OOP is $3500 (met $646.74). Once OOP has been met, pt should be covered at 100%.     Addendum at 1130h: I met with Santa at bedside to introduce home infusion services, review benefits and offer choice of providers. She would like to proceed with [facility] for home IV abx needs. Due to injury to left hand she is unable to infuse IV abx independently. She reports that her  and daughter will manage IV abx in the home. Her  and daughter are unavailable to come into the hospital today for IV education so she will require home RN visit tomorrow for IV education. [facility] will coordinate home IV education. Pt will dose her IV Invanz today prior to discharge. First home dose will be tomorrow. Medication and supplies will be delivered to pt's home prior to first home dose.     Santa is ready for discharge from [facility]'s perspective.     Thank you for the referral.    Asmita Davis RN  [facility] Liaison  [phone] (Mon thru Fri 8am - 5pm)  [phone] Office

## 2021-06-04 NOTE — PROVIDER NOTIFICATION
BP 84/47. Pulse 82. Did have temp of 100.2 tonight also, gave Tylenol. Please advise re: low BP.  Thanks!    Addendum: Order given for 1000cc bolus of LR.

## 2021-06-05 ENCOUNTER — HOME INFUSION (PRE-WILLOW HOME INFUSION) (OUTPATIENT)
Dept: PHARMACY | Facility: CLINIC | Age: 46
End: 2021-06-05

## 2021-06-07 LAB
BACTERIA SPEC CULT: ABNORMAL
BACTERIA SPEC CULT: NORMAL
BACTERIA SPEC CULT: NORMAL
Lab: ABNORMAL
Lab: NORMAL
SPECIMEN SOURCE: ABNORMAL
SPECIMEN SOURCE: NORMAL

## 2021-06-08 ENCOUNTER — HOME INFUSION (PRE-WILLOW HOME INFUSION) (OUTPATIENT)
Dept: PHARMACY | Facility: CLINIC | Age: 46
End: 2021-06-08

## 2021-06-08 ENCOUNTER — MEDICAL CORRESPONDENCE (OUTPATIENT)
Dept: HEALTH INFORMATION MANAGEMENT | Facility: CLINIC | Age: 46
End: 2021-06-08

## 2021-06-08 LAB
AST SERPL W P-5'-P-CCNC: 84 U/L (ref 0–45)
BASOPHILS # BLD AUTO: 0 10E9/L (ref 0–0.2)
BASOPHILS NFR BLD AUTO: 0.5 %
BUN SERPL-MCNC: 13 MG/DL (ref 7–30)
CREAT SERPL-MCNC: 0.81 MG/DL (ref 0.52–1.04)
CRP SERPL-MCNC: 10.8 MG/L (ref 0–8)
DIFFERENTIAL METHOD BLD: NORMAL
EOSINOPHIL # BLD AUTO: 0.5 10E9/L (ref 0–0.7)
EOSINOPHIL NFR BLD AUTO: 6.8 %
ERYTHROCYTE [DISTWIDTH] IN BLOOD BY AUTOMATED COUNT: 12.4 % (ref 10–15)
ERYTHROCYTE [SEDIMENTATION RATE] IN BLOOD BY WESTERGREN METHOD: 11 MM/H (ref 0–20)
GFR SERPL CREATININE-BSD FRML MDRD: 86 ML/MIN/{1.73_M2}
HCT VFR BLD AUTO: 42.1 % (ref 35–47)
HGB BLD-MCNC: 13.6 G/DL (ref 11.7–15.7)
IMM GRANULOCYTES # BLD: 0 10E9/L (ref 0–0.4)
IMM GRANULOCYTES NFR BLD: 0.5 %
LYMPHOCYTES # BLD AUTO: 2.6 10E9/L (ref 0.8–5.3)
LYMPHOCYTES NFR BLD AUTO: 34.4 %
MCH RBC QN AUTO: 28.9 PG (ref 26.5–33)
MCHC RBC AUTO-ENTMCNC: 32.3 G/DL (ref 31.5–36.5)
MCV RBC AUTO: 90 FL (ref 78–100)
MONOCYTES # BLD AUTO: 0.3 10E9/L (ref 0–1.3)
MONOCYTES NFR BLD AUTO: 4.3 %
NEUTROPHILS # BLD AUTO: 4.1 10E9/L (ref 1.6–8.3)
NEUTROPHILS NFR BLD AUTO: 53.5 %
NRBC # BLD AUTO: 0 10*3/UL
NRBC BLD AUTO-RTO: 0 /100
PLATELET # BLD AUTO: 351 10E9/L (ref 150–450)
RBC # BLD AUTO: 4.7 10E12/L (ref 3.8–5.2)
WBC # BLD AUTO: 7.7 10E9/L (ref 4–11)

## 2021-06-08 PROCEDURE — 85652 RBC SED RATE AUTOMATED: CPT | Performed by: INTERNAL MEDICINE

## 2021-06-08 PROCEDURE — 84520 ASSAY OF UREA NITROGEN: CPT | Performed by: INTERNAL MEDICINE

## 2021-06-08 PROCEDURE — 86140 C-REACTIVE PROTEIN: CPT | Performed by: INTERNAL MEDICINE

## 2021-06-08 PROCEDURE — 82565 ASSAY OF CREATININE: CPT | Performed by: INTERNAL MEDICINE

## 2021-06-08 PROCEDURE — 85025 COMPLETE CBC W/AUTO DIFF WBC: CPT | Performed by: INTERNAL MEDICINE

## 2021-06-08 PROCEDURE — 84450 TRANSFERASE (AST) (SGOT): CPT | Performed by: INTERNAL MEDICINE

## 2021-06-09 LAB
BACTERIA SPEC CULT: NORMAL
Lab: NORMAL
SPECIMEN SOURCE: NORMAL

## 2021-06-15 ENCOUNTER — HOME INFUSION (PRE-WILLOW HOME INFUSION) (OUTPATIENT)
Dept: PHARMACY | Facility: CLINIC | Age: 46
End: 2021-06-15

## 2021-06-15 ENCOUNTER — MEDICAL CORRESPONDENCE (OUTPATIENT)
Dept: HEALTH INFORMATION MANAGEMENT | Facility: CLINIC | Age: 46
End: 2021-06-15

## 2021-06-15 LAB
AST SERPL W P-5'-P-CCNC: 66 U/L (ref 0–45)
BASOPHILS # BLD AUTO: 0.1 10E9/L (ref 0–0.2)
BASOPHILS NFR BLD AUTO: 1 %
BUN SERPL-MCNC: 11 MG/DL (ref 7–30)
CREAT SERPL-MCNC: 0.89 MG/DL (ref 0.52–1.04)
CRP SERPL-MCNC: 7.3 MG/L (ref 0–8)
DIFFERENTIAL METHOD BLD: NORMAL
EOSINOPHIL # BLD AUTO: 0.4 10E9/L (ref 0–0.7)
EOSINOPHIL NFR BLD AUTO: 4 %
ERYTHROCYTE [DISTWIDTH] IN BLOOD BY AUTOMATED COUNT: 12.4 % (ref 10–15)
ERYTHROCYTE [SEDIMENTATION RATE] IN BLOOD BY WESTERGREN METHOD: 10 MM/H (ref 0–20)
GFR SERPL CREATININE-BSD FRML MDRD: 76 ML/MIN/{1.73_M2}
HCT VFR BLD AUTO: 42.8 % (ref 35–47)
HGB BLD-MCNC: 13.7 G/DL (ref 11.7–15.7)
LYMPHOCYTES # BLD AUTO: 2.3 10E9/L (ref 0.8–5.3)
LYMPHOCYTES NFR BLD AUTO: 24 %
MCH RBC QN AUTO: 28.5 PG (ref 26.5–33)
MCHC RBC AUTO-ENTMCNC: 32 G/DL (ref 31.5–36.5)
MCV RBC AUTO: 89 FL (ref 78–100)
MONOCYTES # BLD AUTO: 0.4 10E9/L (ref 0–1.3)
MONOCYTES NFR BLD AUTO: 4 %
NEUTROPHILS # BLD AUTO: 6.4 10E9/L (ref 1.6–8.3)
NEUTROPHILS NFR BLD AUTO: 67 %
PLATELET # BLD AUTO: 344 10E9/L (ref 150–450)
RBC # BLD AUTO: 4.8 10E12/L (ref 3.8–5.2)
WBC # BLD AUTO: 9.6 10E9/L (ref 4–11)

## 2021-06-15 PROCEDURE — 85025 COMPLETE CBC W/AUTO DIFF WBC: CPT | Performed by: INTERNAL MEDICINE

## 2021-06-15 PROCEDURE — 85652 RBC SED RATE AUTOMATED: CPT | Performed by: INTERNAL MEDICINE

## 2021-06-15 PROCEDURE — 84520 ASSAY OF UREA NITROGEN: CPT | Performed by: INTERNAL MEDICINE

## 2021-06-15 PROCEDURE — 84450 TRANSFERASE (AST) (SGOT): CPT | Performed by: INTERNAL MEDICINE

## 2021-06-15 PROCEDURE — 82565 ASSAY OF CREATININE: CPT | Performed by: INTERNAL MEDICINE

## 2021-06-15 PROCEDURE — 86140 C-REACTIVE PROTEIN: CPT | Performed by: INTERNAL MEDICINE

## 2021-06-16 NOTE — PROGRESS NOTES
This is a recent snapshot of the patient's [facility] medical record.  For current drug dose and complete information and questions, call [phone] or In Basket pool, [facility]v home infusion ([id number])  CSN Number:  [id number]

## 2021-06-18 ENCOUNTER — HOME INFUSION (PRE-WILLOW HOME INFUSION) (OUTPATIENT)
Dept: PHARMACY | Facility: CLINIC | Age: 46
End: 2021-06-18

## 2021-06-24 NOTE — PROGRESS NOTES
This is a recent snapshot of the patient's [facility] medical record.  For current drug dose and complete information and questions, call [phone] or In Basket pool, fv home infusion ([id number])  CSN Number:  [id number]

## 2021-07-01 NOTE — PROGRESS NOTES
This is a recent snapshot of the patient's [facility] medical record.  For current drug dose and complete information and questions, call [phone] or In Basket pool, fv home infusion ([id number])  CSN Number:  [id number]

## 2021-07-02 NOTE — PROGRESS NOTES
This is a recent snapshot of the patient's [facility] medical record.  For current drug dose and complete information and questions, call [phone] or In Basket pool, fv home infusion ([id number])  CSN Number:  [id number]

## 2025-01-07 ENCOUNTER — TRANSCRIBE ORDERS (OUTPATIENT)
Dept: OTHER | Age: 50
End: 2025-01-07

## 2025-01-07 DIAGNOSIS — M70.62 TROCHANTERIC BURSITIS OF LEFT HIP: ICD-10-CM

## 2025-01-07 DIAGNOSIS — M70.61 TROCHANTERIC BURSITIS OF RIGHT HIP: Primary | ICD-10-CM

## 2025-01-20 ENCOUNTER — THERAPY VISIT (OUTPATIENT)
Dept: PHYSICAL THERAPY | Facility: CLINIC | Age: 50
End: 2025-01-20
Attending: STUDENT IN AN ORGANIZED HEALTH CARE EDUCATION/TRAINING PROGRAM
Payer: COMMERCIAL

## 2025-01-20 DIAGNOSIS — M70.61 TROCHANTERIC BURSITIS OF RIGHT HIP: ICD-10-CM

## 2025-01-20 DIAGNOSIS — M25.552 HIP PAIN, LEFT: ICD-10-CM

## 2025-01-20 DIAGNOSIS — M70.62 TROCHANTERIC BURSITIS OF LEFT HIP: Primary | ICD-10-CM

## 2025-01-20 DIAGNOSIS — M25.551 HIP PAIN, RIGHT: ICD-10-CM

## 2025-01-20 PROCEDURE — 97110 THERAPEUTIC EXERCISES: CPT | Mod: GP | Performed by: PHYSICAL THERAPIST

## 2025-01-20 PROCEDURE — 97161 PT EVAL LOW COMPLEX 20 MIN: CPT | Mod: GP | Performed by: PHYSICAL THERAPIST

## 2025-01-20 ASSESSMENT — ACTIVITIES OF DAILY LIVING (ADL)
WALKING_DOWN_STEEP_HILLS: MODERATE DIFFICULTY
WALKING_15_MINUTES_OR_GREATER: EXTREME DIFFICULTY
RECREATIONAL ACTIVITIES: MODERATE DIFFICULTY
WALKING_15_MINUTES_OR_GREATER: EXTREME DIFFICULTY
SPORTS_TOTAL_ITEM_SCORE: 0
HEAVY_WORK: MODERATE DIFFICULTY
GETTING_INTO_AND_OUT_OF_AN_AVERAGE_CAR: SLIGHT DIFFICULTY
HOW_WOULD_YOU_RATE_YOUR_CURRENT_LEVEL_OF_FUNCTION?: ABNORMAL
STEPPING UP AND DOWN CURBS: SLIGHT DIFFICULTY
STEPPING_UP_AND_DOWN_CURBS: SLIGHT DIFFICULTY
PUTTING_ON_SOCKS_AND_SHOES: SLIGHT DIFFICULTY
ABILITY_TO_PARTICIPATE_IN_YOUR_DESIRED_SPORT_AS_LONG_AS_YOU_WOULD_LIKE: EXTREME DIFFICULTY
PUTTING ON SOCKS AND SHOES: SLIGHT DIFFICULTY
WALKING_DOWN_STEEP_HILLS: MODERATE DIFFICULTY
SITTING_FOR_15_MINUTES: SLIGHT DIFFICULTY
LIGHT_TO_MODERATE_WORK: SLIGHT DIFFICULTY
LIGHT_TO_MODERATE_WORK: SLIGHT DIFFICULTY
ROLLING OVER IN BED: SLIGHT DIFFICULTY
STANDING_FOR_15_MINUTES: MODERATE DIFFICULTY
TWISTING/PIVOTING_ON_INVOLVED_LEG: MODERATE DIFFICULTY
HOS_ADL_ITEM_SCORE_TOTAL: 37
TWISTING/PIVOTING ON INVOLVED LEG: MODERATE DIFFICULTY
WALKING_APPROXIMATELY_10_MINUTES: MODERATE DIFFICULTY
STANDING FOR 15 MINUTES: MODERATE DIFFICULTY
ADL_COUNT: 17
WALKING_INITIALLY: MODERATE DIFFICULTY
GOING UP 1 FLIGHT OF STAIRS: SLIGHT DIFFICULTY
DEEP_SQUATTING: MODERATE DIFFICULTY
GOING_DOWN_1_FLIGHT_OF_STAIRS: SLIGHT DIFFICULTY
DEEP SQUATTING: MODERATE DIFFICULTY
GETTING INTO AND OUT OF AN AVERAGE CAR: SLIGHT DIFFICULTY
WALKING_UP_STEEP_HILLS: MODERATE DIFFICULTY
SITTING FOR 15 MINUTES: SLIGHT DIFFICULTY
SPORTS_SCORE(%): 0
ADL_TOTAL_ITEM_SCORE: 0
WALKING_UP_STEEP_HILLS: MODERATE DIFFICULTY
SPORTS_COUNT: 9
LOW_IMPACT_ACTIVITIES_LIKE_FAST_WALKING: MODERATE DIFFICULTY
PLEASE_INDICATE_YOR_PRIMARY_REASON_FOR_REFERRAL_TO_THERAPY:: HIP
HEAVY_WORK: MODERATE DIFFICULTY
ROLLING_OVER_IN_BED: SLIGHT DIFFICULTY
WALKING_INITIALLY: MODERATE DIFFICULTY
HOW_WOULD_YOU_RATE_YOUR_CURRENT_LEVEL_OF_FUNCTION_DURING_YOUR_SPORTS_RELATED_ACTIVITIES_FROM_0_TO_100_WITH_100_BEING_YOUR_LEVEL_OF_FUNCTION_PRIOR_TO_YOUR_HIP_PROBLEM_AND_0_BEING_THE_INABILITY_TO_PERFORM_ANY_OF_YOUR_USUAL_DAILY_ACTIVITIES?: 40
ABILITY_TO_PERFORM_ACTIVITY_WITH_YOUR_NORMAL_TECHNIQUE: MODERATE DIFFICULTY
RECREATIONAL_ACTIVITIES: MODERATE DIFFICULTY
SPORTS_HIGHEST_POTENTIAL_SCORE: 36
ADL_SCORE(%): 0
ADL_HIGHEST_POTENTIAL_SCORE: 68
HOS_ADL_HIGHEST_POTENTIAL_SCORE: 64
WALKING_FOR_APPROXIMATELY_10_MINUTES: MODERATE DIFFICULTY
GOING_UP_1_FLIGHT_OF_STAIRS: SLIGHT DIFFICULTY
HOS_ADL_SCORE(%): 57.81
SWINGING_OBJECTS_LIKE_A_GOLF_CLUB: MODERATE DIFFICULTY
HOW_WOULD_YOU_RATE_YOUR_CURRENT_LEVEL_OF_FUNCTION_DURING_YOUR_USUAL_ACTIVITIES_OF_DAILY_LIVING_FROM_0_TO_100_WITH_100_BEING_YOUR_LEVEL_OF_FUNCTION_PRIOR_TO_YOUR_HIP_PROBLEM_AND_0_BEING_THE_INABILITY_TO_PERFORM_ANY_OF_YOUR_USUAL_DAILY_ACTIVITIES?: 60
HOW_WOULD_YOU_RATE_YOUR_CURRENT_LEVEL_OF_FUNCTION_DURING_YOUR_USUAL_ACTIVITIES_OF_DAILY_LIVING_FROM_0_TO_100_WITH_100_BEING_YOUR_LEVEL_OF_FUNCTION_PRIOR_TO_YOUR_HIP_PROBLEM_AND_0_BEING_THE_INABILITY_TO_PERFORM_ANY_OF_YOUR_USUAL_DAILY_ACTIVITIES?: 60
GOING DOWN 1 FLIGHT OF STAIRS: SLIGHT DIFFICULTY

## 2025-01-20 NOTE — PROGRESS NOTES
PHYSICAL THERAPY EVALUATION  Type of Visit: Evaluation        Fall Risk Screen:  Fall screen completed by: PT  Have you fallen 2 or more times in the past year?: Yes  Have you fallen and had an injury in the past year?: No  Is patient a fall risk?: No    Subjective         Presenting condition or subjective complaint: hip pain post SPS placement    Had lumbar fusion 2018 L5-S1. Had spinal cord stimulator placed 2022, back pain more manageable since then, but hip/buttock pain since, bilateral hip area. Had injections 1/7/2025 in both hips, reports less pain since then.     Date of onset: 01/07/25 (MD referral date)    Relevant medical history:     Dates & types of surgery: SpinalCord Stimulator placement April 2022. Steroid injections Jan 7 2025    Prior diagnostic imaging/testing results: MRI; X-ray     Prior therapy history for the same diagnosis, illness or injury: Yes    Had PT prior to fusion and after for low back.     Living Environment  Social support: With a significant other or spouse   Type of home: House   Stairs to enter the home:         Ramp: No   Stairs inside the home: Yes 16 Is there a railing: Yes     Help at home: Home management tasks (cooking, cleaning); Home and Yard maintenance tasks  Equipment owned:       Employment: No    Hobbies/Interests:      Patient goals for therapy: all general activities with less pain         Objective   HIP EVALUATION  PAIN: Pain Level at Rest: 0/10  Pain Level with Use: 6/10  Pain Location: bilateral hip, buttock, and at times into groin  Pain Frequency: intermittent  Pain is Exacerbated By: activity in general; prolonged sitting and then sit to stand  Pain is Relieved By: cold, rest, and medication    POSTURE: WNL     ROM:  Right hip AROM WNL with end range pain with IR; left hip flexion 100 limited by pain;  IR 20 with pain.  Standing lumbar AROM WFL    STRENGTH:   Pain: - none + mild ++ moderate +++ severe  Strength Scale: 0-5/5 Left Right   Hip Flexion 4+, ++  (mod) 5-, + (mild)   Hip Extension 5- 5-   Hip Abduction 4 4+   Hip Adduction     Hip Internal Rotation     Hip External Rotation     Knee Flexion 5- 5   Knee Extension 5- 5     LE FLEXIBILITY: WNL  SPECIAL TESTS:  FADIR + on right; pain with IR on left :KONG  (-)    PALPATION:   + Tenderness At Location Left Right   Ischial Tuberosity     Greater Trochanter + +   IT Band     Hip Flexors     Piriformis     PSIS     ASIS     Adductors     Abductors     Iliac Crest     Glut Medius + +   Bursa     Pubis       JOINT MOBILITY: WFL    Assessment & Plan   CLINICAL IMPRESSIONS  Medical Diagnosis: trochanteric bursitis of right hipt; trochanteric bursitis of left hip    Treatment Diagnosis: hip pain, left; hip pain, right   Impression/Assessment: Patient is a 49 year old female with bilateral hip  complaints.  The following significant findings have been identified: Pain, Decreased ROM/flexibility, Decreased joint mobility, Decreased strength, Impaired gait, Impaired muscle performance, and Decreased activity tolerance. These impairments interfere with their ability to perform self care tasks, recreational activities, household chores, driving , household mobility, and community mobility as compared to previous level of function.     Clinical Decision Making (Complexity):  Clinical Presentation: Stable/Uncomplicated  Clinical Presentation Rationale: based on medical and personal factors listed in PT evaluation  Clinical Decision Making (Complexity): Low complexity    PLAN OF CARE  Treatment Interventions:  Interventions: Manual Therapy, Neuromuscular Re-education, Therapeutic Activity, Therapeutic Exercise, Self-Care/Home Management    Long Term Goals     PT Goal 1  Goal Identifier: Ambulation  Goal Description: Minutes patient will be able to  walk; on uneven terrain; on sharp inclines/declines; Able to make sharp turns; Ambulate without gait deviation 30 min  Rationale: to maximize safety and independence with performance  of ADLs and functional tasks;to maximize safety and independence within the home;to maximize safety and independence within the community;to maximize safety and independence with transportation;to maximize safety and independence with self cares  Target Date: 03/17/25  PT Goal 2  Goal Identifier: sitting  Goal Description: able to sit for 60 min, then transfer sit to stand without pain or difficulty pain 1/10  Rationale: to maximize safety and independence with performance of ADLs and functional tasks;to maximize safety and independence within the home;to maximize safety and independence within the community;to maximize safety and independence with transportation;to maximize safety and independence with self cares  Target Date: 03/17/25      Frequency of Treatment: 1/week decreasing to every 2 weeks as progresses  Duration of Treatment: 8 weeks      Education Assessment:   Learner/Method: Patient;Pictures/Video;No Barriers to Learning  Education Comments: Educated pt on findings of the evaluation and reasoning for plan of care.  Pt was able to understand how treatment plan aligns with goals.    Risks and benefits of evaluation/treatment have been explained.   Patient/Family/caregiver agrees with Plan of Care.     Evaluation Time:     PT Eval, Low Complexity Minutes (98902): 18       Signing Clinician: Crystal Larson PT

## 2025-01-27 ENCOUNTER — THERAPY VISIT (OUTPATIENT)
Dept: PHYSICAL THERAPY | Facility: CLINIC | Age: 50
End: 2025-01-27
Attending: STUDENT IN AN ORGANIZED HEALTH CARE EDUCATION/TRAINING PROGRAM
Payer: COMMERCIAL

## 2025-01-27 DIAGNOSIS — M25.552 HIP PAIN, LEFT: ICD-10-CM

## 2025-01-27 DIAGNOSIS — M70.62 TROCHANTERIC BURSITIS OF LEFT HIP: Primary | ICD-10-CM

## 2025-01-27 DIAGNOSIS — M25.551 HIP PAIN, RIGHT: ICD-10-CM

## 2025-01-27 DIAGNOSIS — M70.61 TROCHANTERIC BURSITIS OF RIGHT HIP: ICD-10-CM

## 2025-01-27 PROCEDURE — 97110 THERAPEUTIC EXERCISES: CPT | Mod: GP | Performed by: PHYSICAL THERAPIST

## 2025-02-04 ENCOUNTER — THERAPY VISIT (OUTPATIENT)
Dept: PHYSICAL THERAPY | Facility: CLINIC | Age: 50
End: 2025-02-04
Attending: STUDENT IN AN ORGANIZED HEALTH CARE EDUCATION/TRAINING PROGRAM
Payer: COMMERCIAL

## 2025-02-04 DIAGNOSIS — M25.551 HIP PAIN, RIGHT: ICD-10-CM

## 2025-02-04 DIAGNOSIS — M70.62 TROCHANTERIC BURSITIS OF LEFT HIP: Primary | ICD-10-CM

## 2025-02-04 DIAGNOSIS — M70.61 TROCHANTERIC BURSITIS OF RIGHT HIP: ICD-10-CM

## 2025-02-04 DIAGNOSIS — M25.552 HIP PAIN, LEFT: ICD-10-CM

## 2025-02-04 PROCEDURE — 97110 THERAPEUTIC EXERCISES: CPT | Mod: GP | Performed by: PHYSICAL THERAPIST

## 2025-02-25 ENCOUNTER — THERAPY VISIT (OUTPATIENT)
Dept: PHYSICAL THERAPY | Facility: CLINIC | Age: 50
End: 2025-02-25
Payer: COMMERCIAL

## 2025-02-25 DIAGNOSIS — M70.62 TROCHANTERIC BURSITIS OF LEFT HIP: Primary | ICD-10-CM

## 2025-02-25 DIAGNOSIS — M25.551 HIP PAIN, RIGHT: ICD-10-CM

## 2025-02-25 DIAGNOSIS — M25.552 HIP PAIN, LEFT: ICD-10-CM

## 2025-02-25 DIAGNOSIS — M70.61 TROCHANTERIC BURSITIS OF RIGHT HIP: ICD-10-CM

## 2025-02-25 PROCEDURE — 97110 THERAPEUTIC EXERCISES: CPT | Mod: GP | Performed by: PHYSICAL THERAPIST

## 2025-03-26 PROBLEM — M25.552 HIP PAIN, LEFT: Status: RESOLVED | Noted: 2025-01-20 | Resolved: 2025-03-26

## 2025-03-26 PROBLEM — M70.62 TROCHANTERIC BURSITIS OF LEFT HIP: Status: RESOLVED | Noted: 2025-01-20 | Resolved: 2025-03-26

## 2025-03-26 PROBLEM — M25.551 HIP PAIN, RIGHT: Status: RESOLVED | Noted: 2025-01-20 | Resolved: 2025-03-26

## 2025-03-26 PROBLEM — M70.61 TROCHANTERIC BURSITIS OF RIGHT HIP: Status: RESOLVED | Noted: 2025-01-20 | Resolved: 2025-03-26

## 2025-03-29 ENCOUNTER — HEALTH MAINTENANCE LETTER (OUTPATIENT)
Age: 50
End: 2025-03-29

## (undated) DEVICE — BAG CLEAR TRASH 1.3M 39X33" P4040C

## (undated) DEVICE — PREP SKIN SCRUB TRAY 4461A

## (undated) DEVICE — PREP POVIDONE IODINE SOLUTION 10% 4OZ BOTTLE 29906-004

## (undated) DEVICE — SYR BULB IRRIG 50ML LATEX FREE 0035280

## (undated) DEVICE — DRSG ADAPTIC 3X8" 6113

## (undated) DEVICE — KIT CULTURE ESWAB AEROBE/ANAEROBE WHITE TOP R723480

## (undated) DEVICE — CAST PADDING 4" UNSTERILE 9044

## (undated) DEVICE — TOURNIQUET SGL BLADDER 18"X4" RED 5921-218-135

## (undated) DEVICE — PACK HAND SOP32HARMO

## (undated) DEVICE — SU PROLENE 4-0 PC-3 18" 8634G

## (undated) DEVICE — TUBING SUCTION 12"X1/4" N612

## (undated) DEVICE — LINEN FULL SHEET 5511

## (undated) DEVICE — BNDG ELASTIC 3"X5YDS UNSTERILE 6611-30

## (undated) DEVICE — GLOVE PROTEXIS MICRO 7.0  2D73PM70

## (undated) DEVICE — CAST BUCKET

## (undated) DEVICE — PREP POVIDONE-IODINE 7.5% SCRUB 4OZ BOTTLE MDS093945

## (undated) DEVICE — CAST PLASTER SPLINT 4X15" EXTRA FAST

## (undated) DEVICE — TUBE CULTURE AEROBIC/ANAEROBIC W/O SWABS A.C.T.I.1. 12401

## (undated) DEVICE — APPLICATORS COTTON TIP 6"X2 STERILE LF C15053-006

## (undated) DEVICE — DRSG GAUZE 4X4" 8044

## (undated) DEVICE — CAST PADDING 4" STERILE 9044S

## (undated) DEVICE — LINEN HALF SHEET 5512

## (undated) RX ORDER — PROPOFOL 10 MG/ML
INJECTION, EMULSION INTRAVENOUS
Status: DISPENSED
Start: 2021-06-02

## (undated) RX ORDER — HYDROMORPHONE HYDROCHLORIDE 1 MG/ML
INJECTION, SOLUTION INTRAMUSCULAR; INTRAVENOUS; SUBCUTANEOUS
Status: DISPENSED
Start: 2021-06-02

## (undated) RX ORDER — GINSENG 100 MG
CAPSULE ORAL
Status: DISPENSED
Start: 2021-06-02

## (undated) RX ORDER — METOPROLOL TARTRATE 1 MG/ML
INJECTION, SOLUTION INTRAVENOUS
Status: DISPENSED
Start: 2021-06-02

## (undated) RX ORDER — FENTANYL CITRATE-0.9 % NACL/PF 10 MCG/ML
PLASTIC BAG, INJECTION (ML) INTRAVENOUS
Status: DISPENSED
Start: 2021-06-02

## (undated) RX ORDER — BUPIVACAINE HYDROCHLORIDE 2.5 MG/ML
INJECTION, SOLUTION EPIDURAL; INFILTRATION; INTRACAUDAL
Status: DISPENSED
Start: 2021-06-02

## (undated) RX ORDER — FENTANYL CITRATE 50 UG/ML
INJECTION, SOLUTION INTRAMUSCULAR; INTRAVENOUS
Status: DISPENSED
Start: 2021-06-02

## (undated) RX ORDER — EPHEDRINE SULFATE 50 MG/ML
INJECTION, SOLUTION INTRAMUSCULAR; INTRAVENOUS; SUBCUTANEOUS
Status: DISPENSED
Start: 2021-06-02

## (undated) RX ORDER — LIDOCAINE HYDROCHLORIDE 10 MG/ML
INJECTION, SOLUTION EPIDURAL; INFILTRATION; INTRACAUDAL; PERINEURAL
Status: DISPENSED
Start: 2021-06-02

## (undated) RX ORDER — ONDANSETRON 2 MG/ML
INJECTION INTRAMUSCULAR; INTRAVENOUS
Status: DISPENSED
Start: 2021-06-02

## (undated) RX ORDER — GLYCOPYRROLATE 0.2 MG/ML
INJECTION INTRAMUSCULAR; INTRAVENOUS
Status: DISPENSED
Start: 2021-06-02

## (undated) RX ORDER — DEXAMETHASONE SODIUM PHOSPHATE 4 MG/ML
INJECTION, SOLUTION INTRA-ARTICULAR; INTRALESIONAL; INTRAMUSCULAR; INTRAVENOUS; SOFT TISSUE
Status: DISPENSED
Start: 2021-06-02